# Patient Record
Sex: MALE | Race: WHITE
[De-identification: names, ages, dates, MRNs, and addresses within clinical notes are randomized per-mention and may not be internally consistent; named-entity substitution may affect disease eponyms.]

---

## 2019-12-13 ENCOUNTER — HOSPITAL ENCOUNTER (INPATIENT)
Dept: HOSPITAL 25 - ED | Age: 84
LOS: 8 days | Discharge: HOME | DRG: 698 | End: 2019-12-21
Attending: INTERNAL MEDICINE | Admitting: INTERNAL MEDICINE
Payer: MEDICARE

## 2019-12-13 DIAGNOSIS — M48.00: ICD-10-CM

## 2019-12-13 DIAGNOSIS — M35.9: ICD-10-CM

## 2019-12-13 DIAGNOSIS — E83.42: ICD-10-CM

## 2019-12-13 DIAGNOSIS — N17.9: ICD-10-CM

## 2019-12-13 DIAGNOSIS — J84.9: ICD-10-CM

## 2019-12-13 DIAGNOSIS — G93.41: ICD-10-CM

## 2019-12-13 DIAGNOSIS — E87.5: ICD-10-CM

## 2019-12-13 DIAGNOSIS — N18.3: ICD-10-CM

## 2019-12-13 DIAGNOSIS — B95.2: ICD-10-CM

## 2019-12-13 DIAGNOSIS — E16.2: ICD-10-CM

## 2019-12-13 DIAGNOSIS — D63.1: ICD-10-CM

## 2019-12-13 DIAGNOSIS — I13.0: ICD-10-CM

## 2019-12-13 DIAGNOSIS — Z79.82: ICD-10-CM

## 2019-12-13 DIAGNOSIS — N13.9: Primary | ICD-10-CM

## 2019-12-13 DIAGNOSIS — E78.5: ICD-10-CM

## 2019-12-13 DIAGNOSIS — Z95.1: ICD-10-CM

## 2019-12-13 DIAGNOSIS — I48.91: ICD-10-CM

## 2019-12-13 DIAGNOSIS — N32.0: ICD-10-CM

## 2019-12-13 DIAGNOSIS — E78.00: ICD-10-CM

## 2019-12-13 DIAGNOSIS — I25.10: ICD-10-CM

## 2019-12-13 DIAGNOSIS — E83.39: ICD-10-CM

## 2019-12-13 DIAGNOSIS — M19.90: ICD-10-CM

## 2019-12-13 DIAGNOSIS — I50.43: ICD-10-CM

## 2019-12-13 DIAGNOSIS — K21.9: ICD-10-CM

## 2019-12-13 DIAGNOSIS — N39.0: ICD-10-CM

## 2019-12-13 LAB
ALBUMIN SERPL BCG-MCNC: 3.7 G/DL (ref 3.2–5.2)
ALBUMIN/GLOB SERPL: 1.1 {RATIO} (ref 1–3)
ALP SERPL-CCNC: 228 U/L (ref 34–104)
ALT SERPL W P-5'-P-CCNC: 24 U/L (ref 7–52)
ANION GAP SERPL CALC-SCNC: 10 MMOL/L (ref 2–11)
ANION GAP SERPL CALC-SCNC: 9 MMOL/L (ref 2–11)
APTT PPP: 31.9 SECONDS (ref 26–38)
AST SERPL-CCNC: (no result) U/L (ref 13–39)
AST SERPL-CCNC: 35 U/L (ref 13–39)
BASOPHILS # BLD AUTO: 0 10^3/UL (ref 0–0.2)
BUN SERPL-MCNC: 66 MG/DL (ref 6–24)
BUN SERPL-MCNC: 66 MG/DL (ref 6–24)
BUN/CREAT SERPL: 16.6 (ref 8–20)
BUN/CREAT SERPL: 17 (ref 8–20)
CALCIUM SERPL-MCNC: 10.2 MG/DL (ref 8.6–10.3)
CALCIUM SERPL-MCNC: 8.8 MG/DL (ref 8.6–10.3)
CHLORIDE SERPL-SCNC: 117 MMOL/L (ref 101–111)
CHLORIDE SERPL-SCNC: 117 MMOL/L (ref 101–111)
EOSINOPHIL # BLD AUTO: 0 10^3/UL (ref 0–0.6)
GLOBULIN SER CALC-MCNC: 3.4 G/DL (ref 2–4)
GLUCOSE SERPL-MCNC: 191 MG/DL (ref 70–100)
GLUCOSE SERPL-MCNC: 25 MG/DL (ref 70–100)
HCO3 SERPL-SCNC: 8 MMOL/L (ref 22–32)
HCO3 SERPL-SCNC: 8 MMOL/L (ref 22–32)
HCT VFR BLD AUTO: 30 % (ref 42–52)
HGB BLD-MCNC: 9.9 G/DL (ref 14–18)
INR PPP/BLD: 1.03 (ref 0.82–1.09)
LYMPHOCYTES # BLD AUTO: 0.9 10^3/UL (ref 1–4.8)
MCH RBC QN AUTO: 34 PG (ref 27–31)
MCHC RBC AUTO-ENTMCNC: 33 G/DL (ref 31–36)
MCV RBC AUTO: 103 FL (ref 80–94)
MONOCYTES # BLD AUTO: 0.8 10^3/UL (ref 0–0.8)
NEUTROPHILS # BLD AUTO: 7.2 10^3/UL (ref 1.5–7.7)
NRBC # BLD AUTO: 0.1 10^3/UL
NRBC BLD QL AUTO: 1.1
PLATELET # BLD AUTO: 173 10^3/UL (ref 150–450)
POTASSIUM SERPL-SCNC: (no result) MMOL/L (ref 3.5–5)
POTASSIUM SERPL-SCNC: 5.7 MMOL/L (ref 3.5–5)
POTASSIUM SERPL-SCNC: 6.7 MMOL/L (ref 3.5–5)
PROT SERPL-MCNC: 7.1 G/DL (ref 6.4–8.9)
RBC # BLD AUTO: 2.93 10^6 /UL (ref 4.18–5.48)
RBC UR QL AUTO: (no result)
SODIUM SERPL-SCNC: 134 MMOL/L (ref 135–145)
SODIUM SERPL-SCNC: 135 MMOL/L (ref 135–145)
TROPONIN I SERPL-MCNC: 0.05 NG/ML (ref ?–0.03)
WBC # BLD AUTO: 9 10^3/UL (ref 3.5–10.8)
WBC UR QL AUTO: (no result)

## 2019-12-13 PROCEDURE — 99285 EMERGENCY DEPT VISIT HI MDM: CPT

## 2019-12-13 PROCEDURE — 82330 ASSAY OF CALCIUM: CPT

## 2019-12-13 PROCEDURE — 70450 CT HEAD/BRAIN W/O DYE: CPT

## 2019-12-13 PROCEDURE — 80048 BASIC METABOLIC PNL TOTAL CA: CPT

## 2019-12-13 PROCEDURE — 93005 ELECTROCARDIOGRAM TRACING: CPT

## 2019-12-13 PROCEDURE — 80053 COMPREHEN METABOLIC PANEL: CPT

## 2019-12-13 PROCEDURE — 0T9B30Z DRAINAGE OF BLADDER WITH DRAINAGE DEVICE, PERCUTANEOUS APPROACH: ICD-10-PCS | Performed by: RADIOLOGY

## 2019-12-13 PROCEDURE — BT1BZZZ FLUOROSCOPY OF BLADDER AND URETHRA: ICD-10-PCS | Performed by: RADIOLOGY

## 2019-12-13 PROCEDURE — 80320 DRUG SCREEN QUANTALCOHOLS: CPT

## 2019-12-13 PROCEDURE — 87086 URINE CULTURE/COLONY COUNT: CPT

## 2019-12-13 PROCEDURE — 81003 URINALYSIS AUTO W/O SCOPE: CPT

## 2019-12-13 PROCEDURE — 84100 ASSAY OF PHOSPHORUS: CPT

## 2019-12-13 PROCEDURE — 87077 CULTURE AEROBIC IDENTIFY: CPT

## 2019-12-13 PROCEDURE — 83550 IRON BINDING TEST: CPT

## 2019-12-13 PROCEDURE — 76942 ECHO GUIDE FOR BIOPSY: CPT

## 2019-12-13 PROCEDURE — 83735 ASSAY OF MAGNESIUM: CPT

## 2019-12-13 PROCEDURE — 87040 BLOOD CULTURE FOR BACTERIA: CPT

## 2019-12-13 PROCEDURE — 85730 THROMBOPLASTIN TIME PARTIAL: CPT

## 2019-12-13 PROCEDURE — 82803 BLOOD GASES ANY COMBINATION: CPT

## 2019-12-13 PROCEDURE — 71045 X-RAY EXAM CHEST 1 VIEW: CPT

## 2019-12-13 PROCEDURE — 51102 DRAIN BL W/CATH INSERTION: CPT

## 2019-12-13 PROCEDURE — 51100 DRAIN BLADDER BY NEEDLE: CPT

## 2019-12-13 PROCEDURE — 83540 ASSAY OF IRON: CPT

## 2019-12-13 PROCEDURE — 74176 CT ABD & PELVIS W/O CONTRAST: CPT

## 2019-12-13 PROCEDURE — C2627 CATH, SUPRAPUBIC/CYSTOSCOPIC: HCPCS

## 2019-12-13 PROCEDURE — 81015 MICROSCOPIC EXAM OF URINE: CPT

## 2019-12-13 PROCEDURE — 85027 COMPLETE CBC AUTOMATED: CPT

## 2019-12-13 PROCEDURE — 82746 ASSAY OF FOLIC ACID SERUM: CPT

## 2019-12-13 PROCEDURE — G0480 DRUG TEST DEF 1-7 CLASSES: HCPCS

## 2019-12-13 PROCEDURE — 85025 COMPLETE CBC W/AUTO DIFF WBC: CPT

## 2019-12-13 PROCEDURE — 82728 ASSAY OF FERRITIN: CPT

## 2019-12-13 PROCEDURE — 83605 ASSAY OF LACTIC ACID: CPT

## 2019-12-13 PROCEDURE — 84484 ASSAY OF TROPONIN QUANT: CPT

## 2019-12-13 PROCEDURE — 85610 PROTHROMBIN TIME: CPT

## 2019-12-13 PROCEDURE — 77002 NEEDLE LOCALIZATION BY XRAY: CPT

## 2019-12-13 PROCEDURE — 82607 VITAMIN B-12: CPT

## 2019-12-13 PROCEDURE — 87186 SC STD MICRODIL/AGAR DIL: CPT

## 2019-12-13 PROCEDURE — 87641 MR-STAPH DNA AMP PROBE: CPT

## 2019-12-13 PROCEDURE — 36415 COLL VENOUS BLD VENIPUNCTURE: CPT

## 2019-12-13 NOTE — XMS REPORT
Patient Summary Document

 Created on:2019



Patient:OBDULIA MONTELONGO JR Unknown

Sex:Male

:1932

External Reference #:[id number]





Demographics







 Address  [address] [place] [address]

 

 Home Phone  [phone]

 

 Preferred Language  English

 

 Marital Status  Unknown

 

 [employer] Affiliation  Unknown

 

 Race  Unknown

 

 Ethnic Group  Unknown









Author







 Organization  [facility]









Support







 Name  Relationship  Address  Phone

 

 Unknown Name, Unknown Name  NextOfKin  Unknown Address  Unavailable









Care Team Providers







 Name  Role  Phone

 

 Unavailable  Unavailable  Unavailable









Problems







 Condition  Condition  Condition  Status  Onset  Resolution  Last  Treating  
Comments



 Name  Details  Category    Date  Date  Treatment  Clinician  



             Date    

 

 Pain  frequent  Pain Mgmt  Active  2019      [place]  



   pain      0-16      [place]ck  



         10:00:      [id number]  



         00        

 

 Respiratory  lung sounds  Respirator  Active  2019      [place]  



   deficit  y    0-16      [place]  



         10:00:      [id number]  



         00        

 

 Respiratory  dyspnea  Respirator  Active  2019      [place]  



   present  y    0-16      [place]  



         10:00:      [id number]  



         00        

 

 Endo/Hema  anti-coagul  Endo/Hema  Active  2019      [place]  



   ation      0-16      [place]  



   therapy      10:00:      [id number]  



         00        

 

 Sensory  impaired  Sensory  Active  2019      [place]  



   hearing      0-16      [place]  



         10:00:      [id number]  



         00        

 

 Integument  skin  Integument  Active  2019      [place]  



   integrity      0-16      [place]  



   risk      10:00:      [id number]  



         00        

 

 Nutrition  nutritional  Nutrition  Active  2019      [place]  



   restriction      0-16      [place]  



   s      10:00:      [id number]  



         00        

 

 Elimination  catheter  Eliminatio  Active  2019      [place]  



   present  n    0-16      [place]  



         10:00:      [id number]  



         00        

 

 Neuro  confusion  Neuro/Emot  Active  2019      [place]  



   present  ion    0-16      [place]  



         10:00:      [id number]  



         00        

 

 Neuro  impaired  Neuro/Emot  Active  -      [place]  



   decision-ma  ion    0-16      [place]  



   evelin      10:00:      [id number]  



         00        

 

 Activity  ADL  Activity  Active  -      [place]  



   assistance      0-16      [place]  



   required      10:00:      [id number]  



         00        

 

 Activity  self-care  Activity  Active  -      [place]  



   deficit      0-16      [place]  



         10:00:      [id number]  



         00        

 

 Safety  fall risk  Safety  Active  -      [place]  



   factor      0-16      [place]  



   present      10:00:      [id number]  



         00        

 

 Safety  risk for  Safety  Active  2019      [place]  



   hospitaliza      0-16      [place]  



   tion      10:00:      [id number]  



         00        

 

 Medication  oral med  Meds  Active  2019      [place]  



   assistance      0-16      [place]  



   required      10:00:      [id number]  



         00        

 

 Musculoskel  transfer  Musculoske  Active  2019      [place]  



 etal  assistance  letal    0-16      [place]  



   required      10:00:      [id number]  



         00        

 

 Musculoskel  requires  Musculoske  Active  2019      [place]  



 etal  human  letal    0-16      [place]  



   assist to      10:00:      [id number]  



   leave home      00        

 

 Safety  cannot be  Safety  Active  2019      Angelic  



   left alone      1-12      Ingrahm  



         10:30:      [id number]  



         00        

 

 Safety  can be left  Safety  Active  2019      Angelic  



   alone for      1-12      Ingrahm  



   only short      10:30:      [id number]  



   periods      00        







Allergies, Adverse Reactions, Alerts







 Allergy Name  Allergy  Status  Severity  Reaction(s)  Onset  Inactive  
Treating  Comments



   Type        Date  Date  Clinician  

 

 simvastatin  Base  Active  Unknown  Reaction  2019    Jael Beam  



   Ingredient      Unknown  0-15      







Medications







 Ordered  Filled  Start  Stop  Current  Ordering  Indication  Dosage  Frequency
  Signature  Comments  Components



 Medication  Medication  Date  Date  Medication?  Clinician        (SIG)    



 Name  Name                    

 

 Klor-Con  Klor-Con  2019    Yes  Silcoff    Unknown  Unknown      



 M20 mEq  M20 mEq  0-16      Cholo TORRES            



 tablet,exte  tablet,exte                    



 nded  nded                    



 release  release                    

 

 Tums 200 mg  Tums 200 mg  2019    Yes  Silcoff    Unknown  Unknown      



 calcium  calcium  0-16      Cholo TORRES            



 (500 mg)  (500 mg)                    



 chewable  chewable                    



 tablet  tablet                    

 

 Acetaminoph  Acetaminoph  2019    Yes  Silcoff    Unknown  Unknown      



 en Pain  en Pain  0-16      Cholo TORRES            



 Relief 500  Relief 500                    



 mg tablet  mg tablet                    

 

 oxyCODONE-a  oxyCODONE-a  2019    Yes  Silcoff    Unknown  Unknown      



 cetaminophe  cetaminophe  0-16      Cholo TORRES            



 n 5 mg-325  n 5 mg-325                    



 mg tablet  mg tablet                    

 

 furosemide  furosemide  2019    Yes  Silcoff    Unknown  Unknown      



 20 mg  20 mg  0-16      Cholo TORRES            



 tablet  tablet                    

 

 metoprolol  metoprolol  2019    Yes  Silcoff    Unknown  Unknown      



 succinate  succinate  0-16      Cholo TORRES            



 ER 25 mg  ER 25 mg                    



 tablet,exte  tablet,exte                    



 nded  nded                    



 release 24  release 24                    



 hr  hr                    

 

 hydroxychlo  hydroxychlo  2019    Yes  Silcoff    Unknown  Unknown      



 roquine 200  roquine 200  0-16      Cholo TORRES            



 mg tablet  mg tablet                    

 

 omeprazole  omeprazole  2019    Yes  Silcoff    Unknown  Unknown      



 20 mg  20 mg  0-16      MD,Cholo            



 capsule,del  capsule,del                    



 ayed  ayed                    



 release  release                    

 

 Aspirin Low  Aspirin Low  2019    Yes  Silcoff    Unknown  Unknown      



 Dose 81 mg  Dose 81 mg  0-16      MD,Cholo            



 tablet,dina  tablet,dina                    



 yed release  yed release                    

 

 atorvastati  atorvastati  2019    Yes  Silcoff    Unknown  Unknown      



 n 10 mg  n 10 mg  0-16      MD,Cholo            



 tablet  tablet                    







Vital Signs







 Vital Name  Observation Time  Observation Value  Comments

 

 SYSTOLIC mm[Hg]  2019-10-16 18:08:22  128 mm[Hg] mm[Hg]  Method: Stand

 

 DIASTOLIC mm[Hg]  2019-10-16 18:08:22  70 mm[Hg] mm[Hg]  Method: Stand







Procedures

This patient has no known procedures.



Results

This patient has no known results.

## 2019-12-13 NOTE — XMS REPORT
Patient Summary Document

 Created on:2019



Patient:OBDULIA MONTELONGO JR Unknown

Sex:Male

:1932

External Reference #:[id number]





Demographics







 Address  [address] [place] [address]

 

 Home Phone  [phone]

 

 Preferred Language  English

 

 Marital Status  Unknown

 

 Shinto Affiliation  Unknown

 

 Race  Unknown

 

 Ethnic Group  Unknown









Author







 Organization  [facility]









Support







 Name  Relationship  Address  Phone

 

 Unknown Name, Unknown Name  NextOfKin  Unknown Address  Unavailable









Care Team Providers







 Name  Role  Phone

 

 Unavailable  Unavailable  Unavailable









Problems







 Condition  Condition  Condition  Status  Onset  Resolution  Last  Treating  
Comments



 Name  Details  Category    Date  Date  Treatment  Clinician  



             Date    

 

 Pain  frequent  Pain Mgmt  Resolve  2019    [place]  



   pain    d  0-16  11:00:00    John Day  



         10:00:      [id number]  



         00        

 

 Respiratory  lung sounds  Respirator  Resolve  2019    [place]  



   deficit  y  d  0-16  11:00:00    John Day  



         10:00:      [id number]  



         00        

 

 Respiratory  dyspnea  Respirator  Active  2019      [place]  



   present  y    0-16      John Day  



         10:00:      [id number]  



         00        

 

 Endo/Hema  anti-coagul  Endo/Hema  Resolve  2019    [place]  



   ation    d  0-16  11:00:00    John Day  



   therapy      10:00:      [id number]  



         00        

 

 Sensory  impaired  Sensory  Active  2019      [place]  



   hearing      0-16      John Day  



         10:00:      [id number]  



         00        

 

 Integument  skin  Integument  Resolve  2019    [place]  



   integrity    d  0-16  11:00:00    John Day  



   risk      10:00:      [id number]  



         00        

 

 Nutrition  nutritional  Nutrition  Resolve  2019    [place]  



   restriction    d  0-16  11:00:00    John Day  



   s      10:00:      [id number]  



         00        

 

 Elimination  catheter  Eliminatio  Resolve  2019    [place]  



   present  n  d  0-16  11:00:00    John Day  



         10:00:      [id number]  



         00        

 

 Neuro  confusion  Neuro/Emot  Active  2019      [place]  



   present  ion    0-16      John Day  



         10:00:      [id number]  



         00        

 

 Neuro  impaired  Neuro/Emot  Active  2019      [place]  



   decision-ma  ion    0-16      John Day  



   evelin      10:00:      [id number]  



         00        

 

 Activity  ADL  Activity  Active  2019      [place]  



   assistance      0-16      John Day  



   required      10:00:      [id number]  



         00        

 

 Activity  self-care  Activity  Resolve  2019    [place]  



   deficit    d  0-16  11:00:00    John Day  



         10:00:      [id number]  



         00        

 

 Safety  fall risk  Safety  Active  2019      [place]  



   factor      0-16      John Day  



   present      10:00:      [id number]  



         00        

 

 Safety  risk for  Safety  Active  2019      [place]  



   hospitaliza      0-16      John Day  



   tion      10:00:      [id number]  



         00        

 

 Medication  oral med  Meds  Active  2019      [place]  



   assistance      0-16      John Day  



   required      10:00:      [id number]  



         00        

 

 Musculoskel  transfer  Musculoske  Resolve  2019    [place]  



 etal  assistance  letal  d  0-16  11:00:00    John Day  



   required      10:00:      [id number]  



         00        

 

 Musculoskel  requires  Musculoske  Resolve  2019    [place]  



 etal  human  letal  d  0-16  11:00:00    John Day  



   assist to      10:00:      [id number]  



   leave home      00        

 

 Safety  cannot be  Safety  Active  2019      [place]  



   left alone      1-12      [place]  



         10:30:      [id number]  



         00        

 

 Safety  can be left  Safety  Active  2019      Angelic  



   alone for      1-12      Ingrahm  



   only short      10:30:      [id number]  



   periods      00        







Allergies, Adverse Reactions, Alerts







 Allergy Name  Allergy  Status  Severity  Reaction(s)  Onset  Inactive  
Treating  Comments



   Type        Date  Date  Clinician  

 

 simvastatin  Base  Active  Unknown  Reaction  2019    Jael Beam  



   Ingredient      Unknown  0-15      







Medications







 Ordered  Filled  Start  Stop  Current  Ordering  Indication  Dosage  Frequency
  Signature  Comments  Components



 Medication  Medication  Date  Date  Medication?  Clinician        (SIG)    



 Name  Name                    

 

 Klor-Con  Klor-Con  2019    Yes  Silcoff    Unknown  Unknown      



 M20 mEq  M20 mEq  0-16      Cholo TORRES            



 tablet,exte  tablet,exte                    



 nded  nded                    



 release  release                    

 

 Tums 200 mg  Tums 200 mg  2019    Yes  Silcoff    Unknown  Unknown      



 calcium  calcium  0-16      Cholo TORRES            



 (500 mg)  (500 mg)                    



 chewable  chewable                    



 tablet  tablet                    

 

 Acetaminoph  Acetaminoph  2019    Yes  Silcoff    Unknown  Unknown      



 en Pain  en Pain  0-16      Cholo TORRES            



 Relief 500  Relief 500                    



 mg tablet  mg tablet                    

 

 oxyCODONE-a  oxyCODONE-a  2019    Yes  Silcoff    Unknown  Unknown      



 cetaminophe  cetaminophe  0-16      Cholo TORRES            



 n 5 mg-325  n 5 mg-325                    



 mg tablet  mg tablet                    

 

 furosemide  furosemide  2019    Yes  Silcoff    Unknown  Unknown      



 20 mg  20 mg  0-16      Cohlo TORRES            



 tablet  tablet                    

 

 metoprolol  metoprolol  2019    Yes  Silcoff    Unknown  Unknown      



 succinate  succinate  0-16      MD,Cholo            



 ER 25 mg  ER 25 mg                    



 tablet,exte  tablet,exte                    



 nded  nded                    



 release 24  release 24                    



 hr  hr                    

 

 hydroxychlo  hydroxychlo  2019    Yes  Silcoff    Unknown  Unknown      



 roquine 200  roquine 200  0-16      MD,Cholo            



 mg tablet  mg tablet                    

 

 omeprazole  omeprazole  2019    Yes  Silcoff    Unknown  Unknown      



 20 mg  20 mg  0-16      MD,Cholo            



 capsule,del  capsule,del                    



 ayed  ayed                    



 release  release                    

 

 Aspirin Low  Aspirin Low  2019    Yes  Silcoff    Unknown  Unknown      



 Dose 81 mg  Dose 81 mg  0-16      MD,Cholo            



 tablet,dina  tablet,dina                    



 yed release  yed release                    

 

 atorvastati  atorvastati  2019    Yes  Silcoff    Unknown  Unknown      



 n 10 mg  n 10 mg  0-16      MD,Cholo            



 tablet  tablet                    







Vital Signs







 Vital Name  Observation Time  Observation Value  Comments

 

 SYSTOLIC mm[Hg]  2019 18:09:09  120 mm[Hg] mm[Hg]  Method: Sit

 

 SYSTOLIC mm[Hg]  2019-10-16 18:08:22  128 mm[Hg] mm[Hg]  Method: Stand

 

 DIASTOLIC mm[Hg]  2019 18:09:09  68 mm[Hg] mm[Hg]  Method: Sit

 

 DIASTOLIC mm[Hg]  2019-10-16 18:08:22  70 mm[Hg] mm[Hg]  Method: Stand

 

 PULSE  2019 18:09:09  66 /min /min  

 

 RESP RATE  2019 18:09:09  16 /min /min  

 

 TEMP  2019 18:09:09  97.4 [degF]  







Procedures

This patient has no known procedures.



Results

This patient has no known results.

## 2019-12-13 NOTE — XMS REPORT
Patient Summary Document

 Created on:2019



Patient:OBDULIA MONTELONGO JR Unknown

Sex:Male

:1932

External Reference #:[id number]





Demographics







 Address  [address] [place] [address]

 

 Home Phone  [phone]

 

 Preferred Language  English

 

 Marital Status  Unknown

 

 [employer] Affiliation  Unknown

 

 Race  Unknown

 

 Ethnic Group  Unknown









Author







 Organization  [facility]









Support







 Name  Relationship  Address  Phone

 

 Unknown Name, Unknown Name  NextOfKin  Unknown Address  Unavailable









Care Team Providers







 Name  Role  Phone

 

 Unavailable  Unavailable  Unavailable









Problems







 Condition  Condition  Condition  Status  Onset  Resolution  Last  Treating  
Comments



 Name  Details  Category    Date  Date  Treatment  Clinician  



             Date    

 

 Pain  frequent  Pain Mgmt  Active  2019      [place]  



   pain      0-16      [place]  



         10:00:      [id number]  



         00        

 

 Respiratory  lung sounds  Respirator  Active  2019      [place]  



   deficit  y    0-16      [place]  



         10:00:      [id number]  



         00        

 

 Respiratory  dyspnea  Respirator  Active  2019      [place]  



   present  y    0-16      [place]  



         10:00:      [id number]  



         00        

 

 Endo/Hema  anti-coagul  Endo/Hema  Active  2019      [place]  



   ation      0-16      [place]  



   therapy      10:00:      [id number]  



         00        

 

 Sensory  impaired  Sensory  Active  2019      [place]  



   hearing      0-16      [place]  



         10:00:      [id number]  



         00        

 

 Integument  skin  Integument  Active  2019      [place]  



   integrity      0-16      [place]  



   risk      10:00:      [id number]  



         00        

 

 Nutrition  nutritional  Nutrition  Active  2019      [place]  



   restriction      0-16      [place]  



   s      10:00:      [id number]  



         00        

 

 Elimination  catheter  Eliminatio  Active  2019      [place]  



   present  n    0-16      [place]  



         10:00:      [id number]  



         00        

 

 Neuro  confusion  Neuro/Emot  Active  2019      [place]  



   present  ion    0-16      [place]  



         10:00:      [id number]  



         00        

 

 Neuro  impaired  Neuro/Emot  Active  -      [place]  



   decision-ma  ion    0-16      [place]  



   evelin      10:00:      [id number]  



         00        

 

 Activity  ADL  Activity  Active  -      [place]  



   assistance      0-16      [place]  



   required      10:00:      [id number]  



         00        

 

 Activity  self-care  Activity  Active  -      [place]  



   deficit      0-16      [place]  



         10:00:      [id number]  



         00        

 

 Safety  fall risk  Safety  Active  -      [place]  



   factor      0-16      [place]  



   present      10:00:      [id number]  



         00        

 

 Safety  risk for  Safety  Active  2019      [place]  



   hospitaliza      0-16      [place]  



   tion      10:00:      [id number]  



         00        

 

 Medication  oral med  Meds  Active  2019      [place]  



   assistance      0-16      [place]  



   required      10:00:      [id number]  



         00        

 

 Musculoskel  transfer  Musculoske  Active  2019      [place]  



 etal  assistance  letal    0-16      [place]  



   required      10:00:      [id number]  



         00        

 

 Musculoskel  requires  Musculoske  Active  2019      [place]  



 etal  human  letal    0-16      [place]  



   assist to      10:00:      [id number]  



   leave home      00        







Allergies, Adverse Reactions, Alerts







 Allergy Name  Allergy  Status  Severity  Reaction(s)  Onset  Inactive  
Treating  Comments



   Type        Date  Date  Clinician  

 

 simvastatin  Base  Active  Unknown  Reaction  2019    Jael Beam  



   Ingredient      Unknown  0-15      







Medications







 Ordered  Filled  Start  Stop  Current  Ordering  Indication  Dosage  Frequency
  Signature  Comments  Components



 Medication  Medication  Date  Date  Medication?  Clinician        (SIG)    



 Name  Name                    

 

 Klor-Con  Klor-Con  2019    Yes  Silcoff    Unknown  Unknown      



 M20 mEq  M20 mEq  0-16      Cholo TORRES            



 tablet,exte  tablet,exte                    



 nded  nded                    



 release  release                    

 

 Tums 200 mg  Tums 200 mg  2019    Yes  Silcoff    Unknown  Unknown      



 calcium  calcium  0-16      Cholo TORRES            



 (500 mg)  (500 mg)                    



 chewable  chewable                    



 tablet  tablet                    

 

 Acetaminoph  Acetaminoph  2019    Yes  Silcoff    Unknown  Unknown      



 en Pain  en Pain  0-16      Cholo TORRES            



 Relief 500  Relief 500                    



 mg tablet  mg tablet                    

 

 oxyCODONE-a  oxyCODONE-a  2019    Yes  Silcoff    Unknown  Unknown      



 cetaminophe  cetaminophe  0-16      Cholo TORRES            



 n 5 mg-325  n 5 mg-325                    



 mg tablet  mg tablet                    

 

 furosemide  furosemide  2019    Yes  Silcoff    Unknown  Unknown      



 20 mg  20 mg  0-16      Cholo TORRES            



 tablet  tablet                    

 

 metoprolol  metoprolol  2019    Yes  Silcoff    Unknown  Unknown      



 succinate  succinate  0-16      Cholo TORRES            



 ER 25 mg  ER 25 mg                    



 tablet,exte  tablet,exte                    



 nded  nded                    



 release 24  release 24                    



 hr  hr                    

 

 hydroxychlo  hydroxychlo  2019    Yes  Silcoff    Unknown  Unknown      



 roquine 200  roquine 200  0-16      Cholo TORRES            



 mg tablet  mg tablet                    

 

 omeprazole  omeprazole  2019    Yes  Silcoff    Unknown  Unknown      



 20 mg  20 mg  0-16      Cholo TORRES            



 capsule,del  capsule,del                    



 ayed  ayed                    



 release  release                    

 

 Aspirin Low  Aspirin Low  2019    Yes  Silcoff    Unknown  Unknown      



 Dose 81 mg  Dose 81 mg  0-16      Cholo TORRES            



 tablet,dina  tablet,dina                    



 yed release  yed release                    

 

 atorvastati  atorvastati  -    Yes  Silcoff    Unknown  Unknown      



 n 10 mg  n 10 mg  0-16      Cholo TORRES            



 tablet  tablet                    







Vital Signs







 Vital Name  Observation Time  Observation Value  Comments

 

 SYSTOLIC mm[Hg]  2019-10-16 18:08:22  120 mm[Hg] mm[Hg]  Method: Sit

 

 SYSTOLIC mm[Hg]  2019-10-16 18:08:22  128 mm[Hg] mm[Hg]  Method: Stand

 

 DIASTOLIC mm[Hg]  2019-10-16 18:08:22  70 mm[Hg] mm[Hg]  Method: Sit

 

 DIASTOLIC mm[Hg]  2019-10-16 18:08:22  70 mm[Hg] mm[Hg]  Method: Stand

 

 PULSE  2019-10-16 18:08:22  64 /min /min  

 

 RESP RATE  2019-10-16 18:08:22  14 /min /min  

 

 TEMP  2019-10-16 18:08:22  97.4 [degF]  







Procedures

This patient has no known procedures.



Results

This patient has no known results.

## 2019-12-13 NOTE — XMS REPORT
Patient Summary Document

 Created on:2019



Patient:OBDULIA MONTELONGO JR Unknown

Sex:Male

:1932

External Reference #:[id number]





Demographics







 Address  [address] [place] [address]

 

 Home Phone  [phone]

 

 Preferred Language  English

 

 Marital Status  Unknown

 

 Orthodoxy Affiliation  Unknown

 

 Race  Unknown

 

 Ethnic Group  Unknown









Author







 Organization  [facility]









Support







 Name  Relationship  Address  Phone

 

 Unknown Name, Unknown Name  NextOfKin  Unknown Address  Unavailable









Care Team Providers







 Name  Role  Phone

 

 Unavailable  Unavailable  Unavailable









Problems







 Condition  Condition  Condition  Status  Onset  Resolution  Last  Treating  
Comments



 Name  Details  Category    Date  Date  Treatment  Clinician  



             Date    

 

 Pain  frequent  Pain Mgmt  Active  2019      Keya  



   pain      0-16      Shaw  



         10:00:      [id number]  



         00        

 

 Respiratory  lung sounds  Respirator  Active  2019      [place]  



   deficit  y    0-16      Shaw  



         10:00:      [id number]  



         00        

 

 Respiratory  dyspnea  Respirator  Active  2019      [place]a  



   present  y    0-16      Shaw  



         10:00:      [id number]  



         00        

 

 Endo/Hema  anti-coagul  Endo/Hema  Active  2019      [place]  



   ation      0-16      Shaw  



   therapy      10:00:      [id number]  



         00        

 

 Sensory  impaired  Sensory  Active  2019      Keya  



   hearing      0-16      Shaw  



         10:00:      [id number]  



         00        

 

 Integument  skin  Integument  Active  2019      [place]  



   integrity      0-16      Shaw  



   risk      10:00:      [id number]  



         00        

 

 Nutrition  nutritional  Nutrition  Active  2019      Keya  



   restriction      0-16      Shaw  



   s      10:00:      [id number]  



         00        

 

 Elimination  catheter  Eliminatio  Active  2019      [place]  



   present  n    0-16      Shaw  



         10:00:      [id number]  



         00        

 

 Neuro  confusion  Neuro/Emot  Active  2019      [place]  



   present  ion    0-16      Shaw  



         10:00:      [id number]  



         00        

 

 Neuro  impaired  Neuro/Emot  Active  -      [place]  



   decision-ma  ion    0-16      Shaw  



   evelin      10:00:      [id number]  



         00        

 

 Activity  ADL  Activity  Active  -      [place]  



   assistance      0-16      Shaw  



   required      10:00:      [id number]  



         00        

 

 Activity  self-care  Activity  Active  -      [place]  



   deficit      0-16      Shaw  



         10:00:      [id number]  



         00        

 

 Safety  fall risk  Safety  Active  -      [place]  



   factor      0-16      Shaw  



   present      10:00:      [id number]  



         00        

 

 Safety  risk for  Safety  Active  2019      [place]  



   hospitaliza      0-16      Shaw  



   tion      10:00:      [id number]  



         00        

 

 Medication  oral med  Meds  Active  2019      [place]  



   assistance      0-16      Shaw  



   required      10:00:      [id number]  



         00        

 

 Musculoskel  transfer  Musculoske  Active  2019      [place]  



 etal  assistance  letal    0-16      Shaw  



   required      10:00:      [id number]  



         00        

 

 Musculoskel  requires  Musculoske  Active  2019      [place]a  



 etal  human  letal    0-16      Shaw  



   assist to      10:00:      [id number]  



   leave home      00        







Allergies, Adverse Reactions, Alerts







 Allergy Name  Allergy  Status  Severity  Reaction(s)  Onset  Inactive  
Treating  Comments



   Type        Date  Date  Clinician  

 

 simvastatin  Base  Active  Unknown  Reaction  2019    Jael Beam  



   Ingredient      Unknown  0-15      







Medications







 Ordered  Filled  Start  Stop  Current  Ordering  Indication  Dosage  Frequency
  Signature  Comments  Components



 Medication  Medication  Date  Date  Medication?  Clinician        (SIG)    



 Name  Name                    

 

 Klor-Con  Klor-Con  2019    Yes  Silcoff    Unknown  Unknown      



 M20 mEq  M20 mEq  0-16      Cholo TORRES            



 tablet,exte  tablet,exte                    



 nded  nded                    



 release  release                    

 

 Tums 200 mg  Tums 200 mg  2019    Yes  Silcoff    Unknown  Unknown      



 calcium  calcium  0-16      Cholo TORRES            



 (500 mg)  (500 mg)                    



 chewable  chewable                    



 tablet  tablet                    

 

 Acetaminoph  Acetaminoph  2019    Yes  Silcoff    Unknown  Unknown      



 en Pain  en Pain  0-16      Cholo TORRES            



 Relief 500  Relief 500                    



 mg tablet  mg tablet                    

 

 oxyCODONE-a  oxyCODONE-a  2019    Yes  Silcoff    Unknown  Unknown      



 cetaminophe  cetaminophe  0-16      Cholo TORRES            



 n 5 mg-325  n 5 mg-325                    



 mg tablet  mg tablet                    

 

 furosemide  furosemide  2019    Yes  Silcoff    Unknown  Unknown      



 20 mg  20 mg  0-16      Cholo TORRES            



 tablet  tablet                    

 

 metoprolol  metoprolol  2019    Yes  Silcoff    Unknown  Unknown      



 succinate  succinate  0-16      Cholo TORRES            



 ER 25 mg  ER 25 mg                    



 tablet,exte  tablet,exte                    



 nded  nded                    



 release 24  release 24                    



 hr  hr                    

 

 hydroxychlo  hydroxychlo  2019    Yes  Silcoff    Unknown  Unknown      



 roquine 200  roquine 200  0-16      Cholo TORRES            



 mg tablet  mg tablet                    

 

 omeprazole  omeprazole  2019    Yes  Silcoff    Unknown  Unknown      



 20 mg  20 mg  0-16      Cholo TORRES            



 capsule,del  capsule,del                    



 ayed  ayed                    



 release  release                    

 

 Aspirin Low  Aspirin Low  2019    Yes  Silcoff    Unknown  Unknown      



 Dose 81 mg  Dose 81 mg  0-16      Cholo TORRES            



 tablet,dina  tablet,dina                    



 yed release  yed release                    

 

 atorvastati  atorvastati  -    Yes  Silcoff    Unknown  Unknown      



 n 10 mg  n 10 mg  0-16      Cholo TORRES            



 tablet  tablet                    







Vital Signs







 Vital Name  Observation Time  Observation Value  Comments

 

 SYSTOLIC mm[Hg]  2019-10-16 18:08:22  120 mm[Hg] mm[Hg]  Method: Sit

 

 SYSTOLIC mm[Hg]  2019-10-16 18:08:22  128 mm[Hg] mm[Hg]  Method: Stand

 

 DIASTOLIC mm[Hg]  2019-10-16 18:08:22  70 mm[Hg] mm[Hg]  Method: Sit

 

 DIASTOLIC mm[Hg]  2019-10-16 18:08:22  70 mm[Hg] mm[Hg]  Method: Stand

 

 PULSE  2019-10-16 18:08:22  64 /min /min  

 

 RESP RATE  2019-10-16 18:08:22  14 /min /min  

 

 TEMP  2019-10-16 18:08:22  97.4 [degF]  







Procedures

This patient has no known procedures.



Results

This patient has no known results.

## 2019-12-13 NOTE — XMS REPORT
Patient Summary Document

 Created on:November 15, 2019



Patient:OBDULIA MONTELONGO JR Unknown

Sex:Male

:1932

External Reference #:[id number]





Demographics







 Address  [address] [place] [address]

 

 Home Phone  [phone]

 

 Preferred Language  English

 

 Marital Status  Unknown

 

 Yazidi Affiliation  Unknown

 

 Race  Unknown

 

 Ethnic Group  Unknown









Author







 Organization  [facility]









Support







 Name  Relationship  Address  Phone

 

 Unknown Name, Unknown Name  NextOfKin  Unknown Address  Unavailable









Care Team Providers







 Name  Role  Phone

 

 Unavailable  Unavailable  Unavailable









Problems







 Condition  Condition  Condition  Status  Onset  Resolution  Last  Treating  
Comments



 Name  Details  Category    Date  Date  Treatment  Clinician  



             Date    

 

 Pain  frequent  Pain Mgmt  Active  2019      Keya  



   pain      0-16      [place]  



         10:00:      [id number]  



         00        

 

 Respiratory  lung sounds  Respirator  Active  2019      [place]  



   deficit  y    0-16      [place]  



         10:00:      [id number]  



         00        

 

 Respiratory  dyspnea  Respirator  Active  2019      Keya  



   present  y    0-16      [place]  



         10:00:      [id number]  



         00        

 

 Endo/Hema  anti-coagul  Endo/Hema  Active  2019      Keya  



   ation      0-16      [place]  



   therapy      10:00:      [id number]  



         00        

 

 Sensory  impaired  Sensory  Active  2019      Keya  



   hearing      0-16      [place]  



         10:00:      [id number]  



         00        

 

 Integument  skin  Integument  Active  2019      Keya  



   integrity      0-16      [place]  



   risk      10:00:      [id number]  



         00        

 

 Nutrition  nutritional  Nutrition  Active  2019      Keya  



   restriction      0-16      [place]  



   s      10:00:      [id number]  



         00        

 

 Elimination  catheter  Eliminatio  Active  2019      Keya  



   present  n    0-16      [place]  



         10:00:      [id number]  



         00        

 

 Neuro  confusion  Neuro/Emot  Active  2019      Keya  



   present  ion    0-16      [place]  



         10:00:      [id number]  



         00        

 

 Neuro  impaired  Neuro/Emot  Active  -      Keya  



   decision-ma  ion    0-16      [place]  



   evelin      10:00:      [id number]  



         00        

 

 Activity  ADL  Activity  Active  -      Keya  



   assistance      0-16      [place]  



   required      10:00:      [id number]  



         00        

 

 Activity  self-care  Activity  Active  -      Keya  



   deficit      0-16      [place]  



         10:00:      [id number]  



         00        

 

 Safety  fall risk  Safety  Active  -      Keya  



   factor      0-16      [place]  



   present      10:00:      [id number]  



         00        

 

 Safety  risk for  Safety  Active  2019      [facility]a  



   hospitaliza      0-16      [place]  



   tion      10:00:      [id number]  



         00        

 

 Medication  oral med  Meds  Active  2019      Keya  



   assistance      0-16      [place]  



   required      10:00:      [id number]  



         00        

 

 Musculoskel  transfer  Musculoske  Active  2019      Keya  



 etal  assistance  letal    0-16      [place]  



   required      10:00:      [id number]  



         00        

 

 Musculoskel  requires  Musculoske  Active  2019      Keya  



 etal  human  letal    0-16      [place]  



   assist to      10:00:      [id number]  



   leave home      00        







Allergies, Adverse Reactions, Alerts







 Allergy Name  Allergy  Status  Severity  Reaction(s)  Onset  Inactive  
Treating  Comments



   Type        Date  Date  Clinician  

 

 simvastatin  Base  Active  Unknown  Reaction  2019    Jael Beam  



   Ingredient      Unknown  0-15      







Medications







 Ordered  Filled  Start  Stop  Current  Ordering  Indication  Dosage  Frequency
  Signature  Comments  Components



 Medication  Medication  Date  Date  Medication?  Clinician        (SIG)    



 Name  Name                    

 

 Klor-Con  Klor-Con  2019    Yes  Silcoff    Unknown  Unknown      



 M20 mEq  M20 mEq  0-16      Cholo TORRES            



 tablet,exte  tablet,exte                    



 nded  nded                    



 release  release                    

 

 Tums 200 mg  Tums 200 mg  2019    Yes  Silcoff    Unknown  Unknown      



 calcium  calcium  0-16      Cholo TORRES            



 (500 mg)  (500 mg)                    



 chewable  chewable                    



 tablet  tablet                    

 

 Acetaminoph  Acetaminoph  2019    Yes  Silcoff    Unknown  Unknown      



 en Pain  en Pain  0-16      Cholo TORRES            



 Relief 500  Relief 500                    



 mg tablet  mg tablet                    

 

 oxyCODONE-a  oxyCODONE-a  2019    Yes  Silcoff    Unknown  Unknown      



 cetaminophe  cetaminophe  0-16      Cholo TORRES            



 n 5 mg-325  n 5 mg-325                    



 mg tablet  mg tablet                    

 

 furosemide  furosemide  2019    Yes  Silcoff    Unknown  Unknown      



 20 mg  20 mg  0-16      Cholo TORRES            



 tablet  tablet                    

 

 metoprolol  metoprolol  2019    Yes  Silcoff    Unknown  Unknown      



 succinate  succinate  0-16      Cholo TORRES            



 ER 25 mg  ER 25 mg                    



 tablet,exte  tablet,exte                    



 nded  nded                    



 release 24  release 24                    



 hr  hr                    

 

 hydroxychlo  hydroxychlo  2019    Yes  Silcoff    Unknown  Unknown      



 roquine 200  roquine 200  0-16      Cholo TORRES            



 mg tablet  mg tablet                    

 

 omeprazole  omeprazole  2019    Yes  Silcoff    Unknown  Unknown      



 20 mg  20 mg  0-16      Cholo TORRES            



 capsule,del  capsule,del                    



 ayed  ayed                    



 release  release                    

 

 Aspirin Low  Aspirin Low  2019    Yes  Silcoff    Unknown  Unknown      



 Dose 81 mg  Dose 81 mg  0-16      Cholo TORRES            



 tablet,dina  tablet,dina                    



 yed release  yed release                    

 

 atorvastati  atorvastati  -    Yes  Silcoff    Unknown  Unknown      



 n 10 mg  n 10 mg  0-16      Cholo TORRES            



 tablet  tablet                    







Vital Signs







 Vital Name  Observation Time  Observation Value  Comments

 

 SYSTOLIC mm[Hg]  2019-10-16 18:08:22  120 mm[Hg] mm[Hg]  Method: Sit

 

 SYSTOLIC mm[Hg]  2019-10-16 18:08:22  128 mm[Hg] mm[Hg]  Method: Stand

 

 DIASTOLIC mm[Hg]  2019-10-16 18:08:22  70 mm[Hg] mm[Hg]  Method: Sit

 

 DIASTOLIC mm[Hg]  2019-10-16 18:08:22  70 mm[Hg] mm[Hg]  Method: Stand

 

 PULSE  2019-10-16 18:08:22  64 /min /min  

 

 RESP RATE  2019-10-16 18:08:22  14 /min /min  

 

 TEMP  2019-10-16 18:08:22  97.4 [degF]  







Procedures

This patient has no known procedures.



Results

This patient has no known results.

## 2019-12-13 NOTE — XMS REPORT
Patient Summary Document

 Created on:December 3, 2019



Patient:OBDULIA MONTELONGO JR Unknown

Sex:Male

:1932

External Reference #:[id number]





Demographics







 Address  [address] [place] [address]

 

 Home Phone  [phone]

 

 Preferred Language  English

 

 Marital Status  Unknown

 

 Sabianism Affiliation  Unknown

 

 Race  Unknown

 

 Ethnic Group  Unknown









Author







 Organization  [facility]









Support







 Name  Relationship  Address  Phone

 

 Unknown Name, Unknown Name  NextOfKin  Unknown Address  Unavailable









Care Team Providers







 Name  Role  Phone

 

 Unavailable  Unavailable  Unavailable









Problems







 Condition  Condition  Condition  Status  Onset  Resolution  Last  Treating  
Comments



 Name  Details  Category    Date  Date  Treatment  Clinician  



             Date    

 

 Pain  frequent  Pain Mgmt  Resolve  2019    [place]  



   pain    d  0-16  11:00:00    Connelly  



         10:00:      [id number]  



         00        

 

 Respiratory  lung sounds  Respirator  Resolve  2019    [place]  



   deficit  y  d  0-16  11:00:00    Connelly  



         10:00:      [id number]  



         00        

 

 Respiratory  dyspnea  Respirator  Active  2019      [place]  



   present  y    0-16      [place]nelly  



         10:00:      [id number]  



         00        

 

 Endo/Hema  anti-coagul  Endo/Hema  Resolve  2019    [place]  



   ation    d  0-16  11:00:00    [place]nelly  



   therapy      10:00:      [id number]  



         00        

 

 Sensory  impaired  Sensory  Active  2019      [place]  



   hearing      0-16      Connelly  



         10:00:      [id number]  



         00        

 

 Integument  skin  Integument  Resolve  2019    [place]  



   integrity    d  0-16  11:00:00    Connelly  



   risk      10:00:      [id number]  



         00        

 

 Nutrition  nutritional  Nutrition  Resolve  2019    [place]  



   restriction    d  0-16  11:00:00    [place]nelly  



   s      10:00:      [id number]  



         00        

 

 Elimination  catheter  Eliminatio  Resolve  2019    [place]  



   present  n  d  0-16  11:00:00    [place]nelly  



         10:00:      [id number]  



         00        

 

 Neuro  confusion  Neuro/Emot  Active  2019      [place]  



   present  ion    0-16      [place]nelly  



         10:00:      [id number]  



         00        

 

 Neuro  impaired  Neuro/Emot  Active  2019      [place]  



   decision-ma  ion    0-16      [place]nel[place]  



   evelin      10:00:      [id number]  



         00        

 

 Activity  ADL  Activity  Active  2019      [place]  



   assistance      0-16      [place]  



   required      10:00:      [id number]  



         00        

 

 Activity  self-care  Activity  Resolve  2019    [place]  



   deficit    d  0-16  11:00:00    [place]nelly  



         10:00:      [id number]  



         00        

 

 Safety  fall risk  Safety  Active  2019      [place]  



   factor      0-16      [place]  



   present      10:00:      [id number]  



         00        

 

 Safety  risk for  Safety  Active  2019      [place]  



   hospitaliza      0-16      [place]  



   tion      10:00:      [id number]  



         00        

 

 Medication  oral med  Meds  Active  2019      [place]  



   assistance      0-16      [place]  



   required      10:00:      [id number]  



         00        

 

 Musculoskel  transfer  Musculoske  Resolve  2019    [place]  



 etal  assistance  letal  d  0-16  11:00:00    [place]nelly  



   required      10:00:      [id number]  



         00        

 

 Musculoskel  requires  Musculoske  Resolve  2019    [place]  



 etal  human  letal  d  0-16  11:00:00    [place]  



   assist to      10:00:      [id number]  



   leave home      00        

 

 Safety  cannot be  Safety  Active  2019      Angelic  



   left alone      1-12      [place]  



         10:30:      [id number]  



         00        

 

 Safety  can be left  Safety  Active  2019      Angelic  



   alone for      1-12      Ingrahm  



   only short      10:30:      [id number]  



   periods      00        







Allergies, Adverse Reactions, Alerts







 Allergy Name  Allergy  Status  Severity  Reaction(s)  Onset  Inactive  
Treating  Comments



   Type        Date  Date  Clinician  

 

 simvastatin  Base  Active  Unknown  Reaction  2019    Jael Beam  



   Ingredient      Unknown  0-15      







Medications







 Ordered  Filled  Start  Stop  Current  Ordering  Indication  Dosage  Frequency
  Signature  Comments  Components



 Medication  Medication  Date  Date  Medication?  Clinician        (SIG)    



 Name  Name                    

 

 Klor-Con  Klor-Con  2019    Yes  Silcoff    Unknown  Unknown      



 M20 mEq  M20 mEq  0-16      Cholo TORRES            



 tablet,exte  tablet,exte                    



 nded  nded                    



 release  release                    

 

 Tums 200 mg  Tums 200 mg  2019    Yes  Silcoff    Unknown  Unknown      



 calcium  calcium  0-16      Cholo TORRES            



 (500 mg)  (500 mg)                    



 chewable  chewable                    



 tablet  tablet                    

 

 Acetaminoph  Acetaminoph  2019    Yes  Silcoff    Unknown  Unknown      



 en Pain  en Pain  0-16      Cholo TORRES            



 Relief 500  Relief 500                    



 mg tablet  mg tablet                    

 

 oxyCODONE-a  oxyCODONE-a  2019    Yes  Silcoff    Unknown  Unknown      



 cetaminophe  cetaminophe  0-16      Cholo TORRES            



 n 5 mg-325  n 5 mg-325                    



 mg tablet  mg tablet                    

 

 furosemide  furosemide  2019    Yes  Silcoff    Unknown  Unknown      



 20 mg  20 mg  0-16      Cholo TORRES            



 tablet  tablet                    

 

 metoprolol  metoprolol  2019    Yes  Silcoff    Unknown  Unknown      



 succinate  succinate  0-16      MD,Cholo            



 ER 25 mg  ER 25 mg                    



 tablet,exte  tablet,exte                    



 nded  nded                    



 release 24  release 24                    



 hr  hr                    

 

 hydroxychlo  hydroxychlo  2019    Yes  Silcoff    Unknown  Unknown      



 roquine 200  roquine 200  0-16      MD,Cholo            



 mg tablet  mg tablet                    

 

 omeprazole  omeprazole  2019    Yes  Silcoff    Unknown  Unknown      



 20 mg  20 mg  0-16      MD,Cholo            



 capsule,del  capsule,del                    



 ayed  ayed                    



 release  release                    

 

 Aspirin Low  Aspirin Low  2019    Yes  Silcoff    Unknown  Unknown      



 Dose 81 mg  Dose 81 mg  0-16      MD,Cholo            



 tablet,dina  tablet,dina                    



 yed release  yed release                    

 

 atorvastati  atorvastati  2019    Yes  Silcoff    Unknown  Unknown      



 n 10 mg  n 10 mg  0-16      MD,Cholo            



 tablet  tablet                    







Vital Signs







 Vital Name  Observation Time  Observation Value  Comments

 

 SYSTOLIC mm[Hg]  2019 18:09:09  120 mm[Hg] mm[Hg]  Method: Sit

 

 SYSTOLIC mm[Hg]  2019-10-16 18:08:22  128 mm[Hg] mm[Hg]  Method: Stand

 

 DIASTOLIC mm[Hg]  2019 18:09:09  68 mm[Hg] mm[Hg]  Method: Sit

 

 DIASTOLIC mm[Hg]  2019-10-16 18:08:22  70 mm[Hg] mm[Hg]  Method: Stand

 

 PULSE  2019 18:09:09  66 /min /min  

 

 RESP RATE  2019 18:09:09  16 /min /min  

 

 TEMP  2019 18:09:09  97.4 [degF]  







Procedures

This patient has no known procedures.



Results

This patient has no known results.

## 2019-12-13 NOTE — XMS REPORT
Patient Summary Document

 Created on:2019



Patient:OBDULIA MONTELONGO JR Unknown

Sex:Male

:1932

External Reference #:[id number]





Demographics







 Address  [address] [place] [address]

 

 Home Phone  [phone]

 

 Preferred Language  English

 

 Marital Status  Unknown

 

 Restoration Affiliation  Unknown

 

 Race  Unknown

 

 Ethnic Group  Unknown









Author







 Organization  [facility]









Support







 Name  Relationship  Address  Phone

 

 Unknown Name, Unknown Name  NextOfKin  Unknown Address  Unavailable









Care Team Providers







 Name  Role  Phone

 

 Unavailable  Unavailable  Unavailable









Problems







 Condition  Condition  Condition  Status  Onset  Resolution  Last  Treating  
Comments



 Name  Details  Category    Date  Date  Treatment  Clinician  



             Date    

 

 Pain  frequent  Pain Mgmt  Resolve  2019    [place]  



   pain    d  0-16  11:00:00    [place]  



         10:00:      [id number]  



         00        

 

 Respiratory  lung sounds  Respirator  Resolve  2019    [place]  



   deficit  y  d  0-16  11:00:00    [place]  



         10:00:      [id number]  



         00        

 

 Respiratory  dyspnea  Respirator  Active  2019      [place]  



   present  y    0-16      [place]  



         10:00:      [id number]  



         00        

 

 Endo/Hema  anti-coagul  Endo/Hema  Resolve  2019    [place]  



   ation    d  0-16  11:00:00    [place]  



   therapy      10:00:      [id number]  



         00        

 

 Sensory  impaired  Sensory  Active  2019      [place]  



   hearing      0-16      [place]  



         10:00:      [id number]  



         00        

 

 Integument  skin  Integument  Resolve  2019    [place]  



   integrity    d  0-16  11:00:00    [place]  



   risk      10:00:      [id number]  



         00        

 

 Nutrition  nutritional  Nutrition  Resolve  2019    [place]  



   restriction    d  0-16  11:00:00    [place]  



   s      10:00:      [id number]  



         00        

 

 Elimination  catheter  Eliminatio  Resolve  2019    [place]  



   present  n  d  0-16  11:00:00    [place]  



         10:00:      [id number]  



         00        

 

 Neuro  confusion  Neuro/Emot  Active  2019      [place]  



   present  ion    0-16      [place]  



         10:00:      [id number]  



         00        

 

 Neuro  impaired  Neuro/Emot  Active  2019      [place]  



   decision-ma  ion    0-16      [place]  



   evelin      10:00:      [id number]  



         00        

 

 Activity  ADL  Activity  Active  2019      [place]  



   assistance      0-16      [place]  



   required      10:00:      [id number]  



         00        

 

 Activity  self-care  Activity  Resolve  2019    [place]  



   deficit    d  0-16  11:00:00    [place]  



         10:00:      [id number]  



         00        

 

 Safety  fall risk  Safety  Active  2019      [place]  



   factor      0-16      [place]  



   present      10:00:      [id number]  



         00        

 

 Safety  risk for  Safety  Active  2019      [place]  



   hospitaliza      0-16      [place]  



   tion      10:00:      [id number]  



         00        

 

 Medication  oral med  Meds  Active  2019      [place]  



   assistance      0-16      [place]  



   required      10:00:      [id number]  



         00        

 

 Musculoskel  transfer  Musculoske  Resolve  2019    [place]  



 etal  assistance  letal  d  0-16  11:00:00    [place]  



   required      10:00:      [id number]  



         00        

 

 Musculoskel  requires  Musculoske  Resolve  2019    [place]  



 etal  human  letal  d  0-16  11:00:00    [place]  



   assist to      10:00:      [id number]  



   leave home      00        

 

 Safety  cannot be  Safety  Active  2019      [place]  



   left alone      1-12      [place]  



         10:30:      [id number]  



         00        

 

 Safety  can be left  Safety  Active  2019      [place]  



   alone for      1-12      [place]  



   only short      10:30:      [id number]  



   periods      00        







Allergies, Adverse Reactions, Alerts







 Allergy Name  Allergy  Status  Severity  Reaction(s)  Onset  Inactive  
Treating  Comments



   Type        Date  Date  Clinician  

 

 simvastatin  Base  Active  Unknown  Reaction  2019    Jael Beam  



   Ingredient      Unknown  0-15      







Medications







 Ordered  Filled  Start  Stop  Current  Ordering  Indication  Dosage  Frequency
  Signature  Comments  Components



 Medication  Medication  Date  Date  Medication?  Clinician        (SIG)    



 Name  Name                    

 

 Klor-Con  Klor-Con  2019    Yes  Silcoff    Unknown  Unknown      



 M20 mEq  M20 mEq  0-16      Cholo TORRES            



 tablet,exte  tablet,exte                    



 nded  nded                    



 release  release                    

 

 Tums 200 mg  Tums 200 mg  2019    Yes  Sil[place]ff    Unknown  Unknown      



 calcium  calcium  0-16      Cholo TORRES            



 (500 mg)  (500 mg)                    



 chewable  chewable                    



 tablet  tablet                    

 

 Acetaminoph  Acetaminoph  2019    Yes  Silcoff    Unknown  Unknown      



 en Pain  en Pain  0-16      Cholo TORRES            



 Relief 500  Relief 500                    



 mg tablet  mg tablet                    

 

 oxyCODONE-a  oxyCODONE-a  2019    Yes  Silcoff    Unknown  Unknown      



 cetaminophe  cetaminophe  0-16      Cholo TORRES            



 n 5 mg-325  n 5 mg-325                    



 mg tablet  mg tablet                    

 

 furosemide  furosemide  2019    Yes  Silcoff    Unknown  Unknown      



 20 mg  20 mg  0-16      Cholo TORRES            



 tablet  tablet                    

 

 metoprolol  metoprolol  2019    Yes  Silcoff    Unknown  Unknown      



 succinate  succinate  0-16      MD,Cholo            



 ER 25 mg  ER 25 mg                    



 tablet,exte  tablet,exte                    



 nded  nded                    



 release 24  release 24                    



 hr  hr                    

 

 hydroxychlo  hydroxychlo  2019    Yes  Silcoff    Unknown  Unknown      



 roquine 200  roquine 200  0-16      MD,Cholo            



 mg tablet  mg tablet                    

 

 omeprazole  omeprazole  2019    Yes  Silcoff    Unknown  Unknown      



 20 mg  20 mg  0-16      MD,Cholo            



 capsule,del  capsule,del                    



 ayed  ayed                    



 release  release                    

 

 Aspirin Low  Aspirin Low  2019    Yes  Silcoff    Unknown  Unknown      



 Dose 81 mg  Dose 81 mg  0-16      MD,Cholo            



 tablet,dina  tablet,dina                    



 yed release  yed release                    

 

 atorvastati  atorvastati  2019    Yes  Silcoff    Unknown  Unknown      



 n 10 mg  n 10 mg  0-16      MD,Cholo            



 tablet  tablet                    







Vital Signs







 Vital Name  Observation Time  Observation Value  Comments

 

 SYSTOLIC mm[Hg]  2019 18:09:09  120 mm[Hg] mm[Hg]  Method: Sit

 

 SYSTOLIC mm[Hg]  2019-10-16 18:08:22  128 mm[Hg] mm[Hg]  Method: Stand

 

 DIASTOLIC mm[Hg]  2019 18:09:09  68 mm[Hg] mm[Hg]  Method: Sit

 

 DIASTOLIC mm[Hg]  2019-10-16 18:08:22  70 mm[Hg] mm[Hg]  Method: Stand

 

 PULSE  2019 18:09:09  66 /min /min  

 

 RESP RATE  2019 18:09:09  16 /min /min  

 

 TEMP  2019 18:09:09  97.4 [degF]  







Procedures

This patient has no known procedures.



Results

This patient has no known results.

## 2019-12-13 NOTE — XMS REPORT
Patient Summary Document

 Created on:2019



Patient:OBDULIA MONTELONGO JR Unknown

Sex:Male

:1932

External Reference #:[id number]





Demographics







 Address  [address] [place] [address]

 

 Home Phone  [phone]

 

 Preferred Language  English

 

 Marital Status  Unknown

 

 Denominational Affiliation  Unknown

 

 Race  Unknown

 

 Ethnic Group  Unknown









Author







 Organization  [facility]









Support







 Name  Relationship  Address  Phone

 

 Unknown Name, Unknown Name  NextOfKin  Unknown Address  Unavailable









Care Team Providers







 Name  Role  Phone

 

 Unavailable  Unavailable  Unavailable









Problems







 Condition  Condition  Condition  Status  Onset  Resolution  Last  Treating  
Comments



 Name  Details  Category    Date  Date  Treatment  Clinician  



             Date    

 

 Pain  frequent  Pain Mgmt  Active  2019      [place]  



   pain      0-16      [place]  



         10:00:      [id number]  



         00        

 

 Respiratory  lung sounds  Respirator  Active  2019      [place]  



   deficit  y    0-16      [place]  



         10:00:      [id number]  



         00        

 

 Respiratory  dyspnea  Respirator  Active  2019      [place]  



   present  y    0-16      [place]  



         10:00:      [id number]  



         00        

 

 Endo/Hema  anti-coagul  Endo/Hema  Active  2019      [place]  



   ation      0-16      [place]  



   therapy      10:00:      [id number]  



         00        

 

 Sensory  impaired  Sensory  Active  2019      [place]  



   hearing      0-16      [place]  



         10:00:      [id number]  



         00        

 

 Integument  skin  Integument  Active  2019      [place]  



   integrity      0-16      [place]  



   risk      10:00:      [id number]  



         00        

 

 Nutrition  nutritional  Nutrition  Active  2019      [place]  



   restriction      0-16      [place]  



   s      10:00:      [id number]  



         00        

 

 Elimination  catheter  Eliminatio  Active  2019      [place]  



   present  n    0-16      [place]  



         10:00:      [id number]  



         00        

 

 Neuro  confusion  Neuro/Emot  Active  2019      [place]  



   present  ion    0-16      [place]  



         10:00:      [id number]  



         00        

 

 Neuro  impaired  Neuro/Emot  Active  -      [place]  



   decision-ma  ion    0-16      [place]  



   evelin      10:00:      [id number]  



         00        

 

 Activity  ADL  Activity  Active  -      [place]  



   assistance      0-16      [place]  



   required      10:00:      [id number]  



         00        

 

 Activity  self-care  Activity  Active  -      [place]  



   deficit      0-16      [place]  



         10:00:      [id number]  



         00        

 

 Safety  fall risk  Safety  Active  -      [place]  



   factor      0-16      [place]  



   present      10:00:      [id number]  



         00        

 

 Safety  risk for  Safety  Active  2019      [place]  



   hospitaliza      0-16      [place]  



   tion      10:00:      [id number]  



         00        

 

 Medication  oral med  Meds  Active  2019      [place]  



   assistance      0-16      [place]  



   required      10:00:      [id number]  



         00        

 

 Musculoskel  transfer  Musculoske  Active  2019      [place]  



 etal  assistance  letal    0-16      [place]  



   required      10:00:      [id number]  



         00        

 

 Musculoskel  requires  Musculoske  Active  2019      [place]  



 etal  human  letal    0-16      [place]  



   assist to      10:00:      [id number]  



   leave home      00        

 

 Safety  cannot be  Safety  Active  2019      [place]  



   left alone      1-12      [place]  



         10:30:      [id number]  



         00        

 

 Safety  can be left  Safety  Active  2019      [place]  



   alone for      1-12      [place]  



   only short      10:30:      [id number]  



   periods      00        







Allergies, Adverse Reactions, Alerts







 Allergy Name  Allergy  Status  Severity  Reaction(s)  Onset  Inactive  
Treating  Comments



   Type        Date  Date  Clinician  

 

 simvastatin  Base  Active  Unknown  Reaction  2019    Jael Beam  



   Ingredient      Unknown  0-15      







Medications







 Ordered  Filled  Start  Stop  Current  Ordering  Indication  Dosage  Frequency
  Signature  Comments  Components



 Medication  Medication  Date  Date  Medication?  Clinician        (SIG)    



 Name  Name                    

 

 Klor-Con  Klor-Con  2019    Yes  Silcoff    Unknown  Unknown      



 M20 mEq  M20 mEq  0-16      Cholo TORRES            



 tablet,exte  tablet,exte                    



 nded  nded                    



 release  release                    

 

 Tums 200 mg  Tums 200 mg  2019    Yes  Silcoff    Unknown  Unknown      



 calcium  calcium  0-16      Cholo TORRES            



 (500 mg)  (500 mg)                    



 chewable  chewable                    



 tablet  tablet                    

 

 Acetaminoph  Acetaminoph  2019    Yes  Silcoff    Unknown  Unknown      



 en Pain  en Pain  0-16      Cholo TORRES            



 Relief 500  Relief 500                    



 mg tablet  mg tablet                    

 

 oxyCODONE-a  oxyCODONE-a  2019    Yes  Silcoff    Unknown  Unknown      



 cetaminophe  cetaminophe  0-16      Cholo TORRES            



 n 5 mg-325  n 5 mg-325                    



 mg tablet  mg tablet                    

 

 furosemide  furosemide  2019    Yes  Silcoff    Unknown  Unknown      



 20 mg  20 mg  0-16      Cholo TORRES            



 tablet  tablet                    

 

 metoprolol  metoprolol  2019    Yes  Silcoff    Unknown  Unknown      



 succinate  succinate  0-16      Cholo TORRES            



 ER 25 mg  ER 25 mg                    



 tablet,exte  tablet,exte                    



 nded  nded                    



 release 24  release 24                    



 hr  hr                    

 

 hydroxychlo  hydroxychlo  2019    Yes  Silcoff    Unknown  Unknown      



 roquine 200  roquine 200  0-16      Cholo TORRES            



 mg tablet  mg tablet                    

 

 omeprazole  omeprazole  2019    Yes  Silcoff    Unknown  Unknown      



 20 mg  20 mg  0-16      MD,Cholo            



 capsule,del  capsule,del                    



 ayed  ayed                    



 release  release                    

 

 Aspirin Low  Aspirin Low  2019    Yes  Silcoff    Unknown  Unknown      



 Dose 81 mg  Dose 81 mg  0-16      MD,Cholo            



 tablet,dina  tablet,dina                    



 yed release  yed release                    

 

 atorvastati  atorvastati  2019    Yes  Silcoff    Unknown  Unknown      



 n 10 mg  n 10 mg  0-16      MD,Cholo            



 tablet  tablet                    







Vital Signs







 Vital Name  Observation Time  Observation Value  Comments

 

 SYSTOLIC mm[Hg]  2019-10-16 18:08:22  128 mm[Hg] mm[Hg]  Method: Stand

 

 DIASTOLIC mm[Hg]  2019-10-16 18:08:22  70 mm[Hg] mm[Hg]  Method: Stand







Procedures

This patient has no known procedures.



Results

This patient has no known results.

## 2019-12-13 NOTE — ED
Shortness of Breath





- HPI Summary


HPI Summary: 


This patient is an 87 year old male with a Hx of CHF brought in by EMS 

presenting to [facility] with a chief complaint of SOB/altered mental status. The 

patient's last known normally was 1900 last night. EMS noted he was 

hypoglycemic and administered glucagon. Patient was found unresponsive this 

morning while snoring and could not be aroused. Glucose is 44 g/dl in the ED. 

The patient is not a known diabetic. Patient still could not be aroused. Due to 

this patient's AMS, this HPI is a level 5 caveat. 




















- History of Current Complaint


Hx Obtained From: EMS, Medical Records


Onset/Duration: Still Present





- Allergy/Home Medications


Allergies/Adverse Reactions: 


 Allergies











Allergy/AdvReac Type Severity Reaction Status Date / Time


 


No Known Allergies Allergy   Verified 08/07/19 21:48











Home Medications: 


 Home Medications





Acetaminophen [Acetaminophen Extra Strength] 500 mg PO BID PRN 12/13/19 [

History Confirmed 12/13/19]


Calcium Carbonate CHEW TAB* [Tums*] 500 mg PO BID 12/13/19 [History Confirmed 12 /13/19]


Psyllium STEVENSON* [Metamucil STEVENSON*] 1 pkt PO DAILY 12/13/19 [History Confirmed 12/13/ 19]


oxyCODONE/Acetamin 5/325 MG* [Percocet 5/325 TAB*] 1 tab PO Q6H PRN 12/13/19 [

History Confirmed 12/13/19]











PMH/Surg Hx/FS Hx/Imm Hx


Endocrine/Hematology History: 


   Denies: Hx Diabetes, Hx Thyroid Disease


Cardiovascular History: Reports: Hx Congestive Heart Failure, Hx Coronary 

Artery Disease, Hx Hypercholesterolemia, Hx Hypertension, Hx Pacemaker/ICD


Respiratory History: 


   Denies: Hx Asthma, Hx Chronic Obstructive Pulmonary Disease (COPD)


GI History: Reports: Hx Gastroesophageal Reflux Disease


   Denies: Hx Ulcer


 History: Reports: Hx Renal Disease - abnormal


   Denies: Hx Dialysis


Musculoskeletal History: Reports: Hx Arthritis, Hx Back Problems - spinal 

stenosis


Sensory History: Reports: Hx Cataracts, Hx Contacts or Glasses - reading glasses


   Denies: Hx Hearing Aid


Opthamlomology History: Reports: Hx Cataracts, Hx Contacts or Glasses - reading 

glasses


Neurological History: 


   Denies: Hx Developmental Delay


Psychiatric History: 


   Denies: Hx Panic Disorder





- Surgical History


Surgery Procedure, Year, and Place: HERNIA SURGERY 1965-CATARACTS BILATERAL 

EYES 2011,cardiac bypass, tonsillectomy, status post pacemaker


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


   Denies: Hx Hepatitis, Hx Human Immunodeficiency Virus (HIV)





- Family History


Known Family History: 


   Negative: Cardiac Disease





- Social History


Alcohol Use: None


Hx Substance Use: No


Substance Use Type: Reports: None


Hx Tobacco Use: No


Smoking Status (MU): Never Smoked Tobacco


Have You Smoked in the Last Year: No





- Additional Comments


History Additional Comments: 


LEVEL 5 CAVEAT: PMH Limited by AMS





Review of Systems


Positive: Shortness Of Breath


Neurological: Other - AMS


All Other Systems Reviewed And Are Negative: No





- Comments


Additional Review of Systems Comments: 





LEVEL 5 CAVEAT: ROS Limited by AMS 





Physical Exam





- Summary


Physical Exam Summary: 





Constitutional: Well-developed, Well-nourished, Alert. (-) Distressed


Skin: Warm, Dry


HENT: Normocephalic; Atraumatic. Pinpoint pupils 


Eyes: Conjunctiva normal


Neck: Musculoskeletal ROM normal neck. (-) JVD, (-) Stridor, (-) Tracheal 

deviation


Cardio: Rhythm regular, rate normal, Heart sounds normal; Intact distal pulses; 

The pedal pulses are 2+ and symmetric. Radial pulses are 2+ and symmetric. (-) 

Murmur


Pulmonary/Chest wall:  (-) Wheezes, mild crackles in the bases bilaterally. 


Abd: Soft, (-) tenderness, (-) Distension, (-) Guarding, (-) Rebound


Musculoskeletal: 1+ pitting edema bilaterally


Lymph: (-) Cervical adenopathy


Neuro: Withdraws from painful stimulus, non-verbal, snoring. 


Psych: Mood and affect Normal





Triage Information Reviewed: Yes


Vital Signs Reviewed: Yes


Completion Of Physical Exam Limited Due To: Altered Mental Status





Procedures





- Sedation


Patient Received Moderate/Deep Sedation with Procedure: No





Diagnostics





- Laboratory


Result Diagrams: 


 12/21/19 07:20





 12/21/19 07:20


Lab Statement: Any lab studies that have been ordered have been reviewed, and 

results considered in the medical decision making process.





- Radiology


  ** CXR


Radiology Interpretation Completed By: Radiologist


Summary of Radiographic Findings: Mild pulmonary vascular congestion and 

interstitial edema. ED Provider has reviewed this report.





- CT


  ** Abd/Pel


CT Interpretation Completed By: Radiologist


Summary of CT Findings: 1. Cholelithiasis.  2. No Hydronephrosis or 

nephrolithiasis.  3. Fat-Containing right inguinal hernia.  ED Provider has 

reviwed this report.





  ** Brain


CT Interpretation Completed By: Radiologist


Summary of CT Findings: 1. No acute intracranial abnorality by CT (MRI is more 

sensitive for acute infarct especially in posterior fossa).  2. Mild chronic 

small vessel ischemic disease is likely.  ED Provider has reviewed this report.





- EKG


  ** 1251


Cardiac Rate: NL - 83 BPM


Summary of EKG Findings: Paced rhythm, no STEMI. ED Physician has reviewed and 

interpreted this report.





Re-Evaluation





- Re-Evaluation


  ** First Eval


Re-Evaluation Time: 19:02


Comment: Patient had suprapubic catheter placed successfully.





Course/Dx





- Course


Course Of Treatment: This patient is an 87 year old male brought in by EMS 

presenting to [facility] with a chief complaint of SOB/altered mental status. 

Patient was administered Narcan, Dextrose 50%, . On physical exam, patient 

withdraws from painful stimuli, has pinpoint pupils, crackles in the bases 

bilaterally, and 1+ pitting edema bilaterally. Dr. Simba Kaur, Urology came 

in to place a catheter.  Labs reveal RBC 2.93 L, Hgb 9.9 L, Hct 30 L,  H

, MCH 34 H, RDW 16 H, MPV 10.9 H, Absolute Lymphs 0.9 L, VBG pH 7.13 L, VBG 

pCO2 29 L, VBG HCO3 9.4 L, VBG O2 Saturation 47.7 L, VBG Base Excess -18.2 L. 

Sodium 124 L, Potassium 6.7 H, Chloride 117 H, CO2 8 L, BUN 66 H, Creatinine 

3.98 H, Glucose 25 L, POC Glucose 138 H, Alkaline Phosphatase 228 H, Troponin I 

0.05 H. CXR reveals Mild pulmonary vascular congestion and interstitial edema. 

CT Abd/Pel reveals 1. Cholelithiasis.  2. No Hydronephrosis or nephrolithiasis.

  3. Fat-Containing right inguinal hernia.  Brain CT reveals 1. No acute 

intracranial abnorality by CT (MRI is more sensitive for acute infarct 

especially in posterior fossa).  2. Mild chronic small vessel ischemic disease 

is likely.  Dr. Graham, Hospitalist, stated to start treating for hyperkalemia 

and the hospitalist service would be down to reassess. Consulted with Dr. Mitchell for suprapubic placement. Consulted with Dr. Bolden for critical care.  

Discussed with Char Cristina nurse practitioner at 7:15 PM, she is passing the 

admission on to Dr. Gonzales due to high acuity in the ICU.





- Diagnoses


Provider Diagnoses: 


 Severe sepsis, Fluid overload, Renal failure, Urinary obstruction, Hyperkalemia








- Physician Notifications


Discussed Care of Patient With: Simba Kaur - Urology


Time Discussed With Above Provider: 15:05


Instructed by Provider To: MD Will See In ED - For catheter placement ASAP





- Critical Care Time


Critical Care Time:  min - 90 mins





Discharge ED





- Sign-Out/Discharge


Documenting (check all that apply): Patient Departure - Admission





- Discharge Plan


Condition: Good


Disposition: ADMITTED TO [facility]





- Billing Disposition and Condition


Condition: GOOD


Disposition: Admitted to [facility]





- Attestation Statements


Document Initiated by Denise: Yes


Documenting Scribe: Mert Schreiber


Provider For Whom Denise is Documenting (Include Credential): Fortunato Yoo MD


Scribe Attestation: 


IMert, scribed for Fortunato Yoo MD on 12/21/19 at 0954. 


Scribe Documentation Reviewed: Yes


Provider Attestation: 


The documentation as recorded by the Mert aleman accurately 

reflects the service I personally performed and the decisions made by me, Fortunato Yoo MD


Status of Scribe Document: Viewed

## 2019-12-13 NOTE — XMS REPORT
Patient Summary Document

 Created on:2019



Patient:OBDULIA MONTELONGO JR Unknown

Sex:Male

:1932

External Reference #:[id number]





Demographics







 Address  [address] [place] [address]

 

 Home Phone  [phone]

 

 Preferred Language  English

 

 Marital Status  Unknown

 

 Taoism Affiliation  Unknown

 

 Race  Unknown

 

 Ethnic Group  Unknown









Author







 Organization  [facility]









Support







 Name  Relationship  Address  Phone

 

 Unknown Name, Unknown Name  NextOfKin  Unknown Address  Unavailable









Care Team Providers







 Name  Role  Phone

 

 Unavailable  Unavailable  Unavailable









Problems







 Condition  Condition  Condition  Status  Onset  Resolution  Last  Treating  
Comments



 Name  Details  Category    Date  Date  Treatment  Clinician  



             Date    

 

 Pain  frequent  Pain Mgmt  Active  2019      [place]  



   pain      0-16      [place]  



         10:00:      [id number]  



         00        

 

 Respiratory  lung sounds  Respirator  Active  2019      [place]  



   deficit  y    0-16      [place]  



         10:00:      [id number]  



         00        

 

 Respiratory  dyspnea  Respirator  Active  2019      [place]  



   present  y    0-16      [place]  



         10:00:      [id number]  



         00        

 

 Endo/Hema  anti-coagul  Endo/Hema  Active  2019      [place]  



   ation      0-16      [place]  



   therapy      10:00:      [id number]  



         00        

 

 Sensory  impaired  Sensory  Active  2019      [place]  



   hearing      0-16      [place]  



         10:00:      [id number]  



         00        

 

 Integument  skin  Integument  Active  2019      [place]  



   integrity      0-16      [place]  



   risk      10:00:      [id number]  



         00        

 

 Nutrition  nutritional  Nutrition  Active  2019      [place]  



   restriction      0-16      [place]  



   s      10:00:      [id number]  



         00        

 

 Elimination  catheter  Eliminatio  Active  2019      [place]  



   present  n    0-16      [place]  



         10:00:      [id number]  



         00        

 

 Neuro  confusion  Neuro/Emot  Active  2019      [place]  



   present  ion    0-16      [place]  



         10:00:      [id number]  



         00        

 

 Neuro  impaired  Neuro/Emot  Active  -      [place]  



   decision-ma  ion    0-16      [place]  



   evelin      10:00:      [id number]  



         00        

 

 Activity  ADL  Activity  Active  -      [place]  



   assistance      0-16      [place]  



   required      10:00:      [id number]  



         00        

 

 Activity  self-care  Activity  Active  -      [place]  



   deficit      0-16      [place]  



         10:00:      [id number]  



         00        

 

 Safety  fall risk  Safety  Active  -      [place]  



   factor      0-16      [place]  



   present      10:00:      [id number]  



         00        

 

 Safety  risk for  Safety  Active  2019      [place]  



   hospitaliza      0-16      [place]  



   tion      10:00:      [id number]  



         00        

 

 Medication  oral med  Meds  Active  2019      [place]  



   assistance      0-16      [place]  



   required      10:00:      [id number]  



         00        

 

 Musculoskel  transfer  Musculoske  Active  2019      [place]  



 etal  assistance  letal    0-16      [place]  



   required      10:00:      [id number]  



         00        

 

 Musculoskel  requires  Musculoske  Active  2019      [place]  



 etal  human  letal    0-16      [place]  



   assist to      10:00:      [id number]  



   leave home      00        

 

 Safety  cannot be  Safety  Active  2019      [facility]  



   left alone      1-12      [place]  



         10:30:      [id number]  



         00        

 

 Safety  can be left  Safety  Active  2019      [facility]ic  



   alone for      1-12      [place]  



   only short      10:30:      [id number]  



   periods      00        







Allergies, Adverse Reactions, Alerts







 Allergy Name  Allergy  Status  Severity  Reaction(s)  Onset  Inactive  
Treating  Comments



   Type        Date  Date  Clinician  

 

 simvastatin  Base  Active  Unknown  Reaction  2019    Jael Beam  



   Ingredient      Unknown  0-15      







Medications







 Ordered  Filled  Start  Stop  Current  Ordering  Indication  Dosage  Frequency
  Signature  Comments  Components



 Medication  Medication  Date  Date  Medication?  Clinician        (SIG)    



 Name  Name                    

 

 Klor-Con  Klor-Con  2019    Yes  Silcoff    Unknown  Unknown      



 M20 mEq  M20 mEq  0-16      Cholo TORRES            



 tablet,exte  tablet,exte                    



 nded  nded                    



 release  release                    

 

 Tums 200 mg  Tums 200 mg  2019    Yes  Silcoff    Unknown  Unknown      



 calcium  calcium  0-16      Cholo TORRES            



 (500 mg)  (500 mg)                    



 chewable  chewable                    



 tablet  tablet                    

 

 Acetaminoph  Acetaminoph  2019    Yes  Silcoff    Unknown  Unknown      



 en Pain  en Pain  0-16      Cholo TORRES            



 Relief 500  Relief 500                    



 mg tablet  mg tablet                    

 

 oxyCODONE-a  oxyCODONE-a  2019    Yes  Silcoff    Unknown  Unknown      



 cetaminophe  cetaminophe  0-16      Cholo TORRES            



 n 5 mg-325  n 5 mg-325                    



 mg tablet  mg tablet                    

 

 furosemide  furosemide  2019    Yes  Silcoff    Unknown  Unknown      



 20 mg  20 mg  0-16      Cholo TORRES            



 tablet  tablet                    

 

 metoprolol  metoprolol  2019    Yes  Silcoff    Unknown  Unknown      



 succinate  succinate  0-16      Cholo TORRES            



 ER 25 mg  ER 25 mg                    



 tablet,exte  tablet,exte                    



 nded  nded                    



 release 24  release 24                    



 hr  hr                    

 

 hydroxychlo  hydroxychlo  2019    Yes  Silcoff    Unknown  Unknown      



 roquine 200  roquine 200  0-16      Cholo TORRES            



 mg tablet  mg tablet                    

 

 omeprazole  omeprazole  2019    Yes  Silcoff    Unknown  Unknown      



 20 mg  20 mg  0-16      MD,Cholo            



 capsule,del  capsule,del                    



 ayed  ayed                    



 release  release                    

 

 Aspirin Low  Aspirin Low  2019    Yes  Silcoff    Unknown  Unknown      



 Dose 81 mg  Dose 81 mg  0-16      MD,Cholo            



 tablet,dina  tablet,dina                    



 yed release  yed release                    

 

 atorvastati  atorvastati  2019    Yes  Silcoff    Unknown  Unknown      



 n 10 mg  n 10 mg  0-16      MD,Cholo            



 tablet  tablet                    







Vital Signs







 Vital Name  Observation Time  Observation Value  Comments

 

 SYSTOLIC mm[Hg]  2019-10-16 18:08:22  128 mm[Hg] mm[Hg]  Method: Stand

 

 DIASTOLIC mm[Hg]  2019-10-16 18:08:22  70 mm[Hg] mm[Hg]  Method: Stand







Procedures

This patient has no known procedures.



Results

This patient has no known results.

## 2019-12-14 LAB
ANION GAP SERPL CALC-SCNC: 10 MMOL/L (ref 2–11)
ANION GAP SERPL CALC-SCNC: 11 MMOL/L (ref 2–11)
ANION GAP SERPL CALC-SCNC: 8 MMOL/L (ref 2–11)
ANION GAP SERPL CALC-SCNC: 9 MMOL/L (ref 2–11)
BUN SERPL-MCNC: 53 MG/DL (ref 6–24)
BUN SERPL-MCNC: 60 MG/DL (ref 6–24)
BUN SERPL-MCNC: 60 MG/DL (ref 6–24)
BUN SERPL-MCNC: 62 MG/DL (ref 6–24)
BUN/CREAT SERPL: 15.2 (ref 8–20)
BUN/CREAT SERPL: 16 (ref 8–20)
BUN/CREAT SERPL: 16.2 (ref 8–20)
BUN/CREAT SERPL: 16.9 (ref 8–20)
CALCIUM SERPL-MCNC: 7.7 MG/DL (ref 8.6–10.3)
CALCIUM SERPL-MCNC: 8.5 MG/DL (ref 8.6–10.3)
CALCIUM SERPL-MCNC: 8.6 MG/DL (ref 8.6–10.3)
CALCIUM SERPL-MCNC: 8.9 MG/DL (ref 8.6–10.3)
CHLORIDE SERPL-SCNC: 106 MMOL/L (ref 101–111)
CHLORIDE SERPL-SCNC: 110 MMOL/L (ref 101–111)
CHLORIDE SERPL-SCNC: 112 MMOL/L (ref 101–111)
CHLORIDE SERPL-SCNC: 114 MMOL/L (ref 101–111)
GLUCOSE SERPL-MCNC: 110 MG/DL (ref 70–100)
GLUCOSE SERPL-MCNC: 116 MG/DL (ref 70–100)
GLUCOSE SERPL-MCNC: 116 MG/DL (ref 70–100)
GLUCOSE SERPL-MCNC: 98 MG/DL (ref 70–100)
HCO3 SERPL-SCNC: 10 MMOL/L (ref 22–32)
HCO3 SERPL-SCNC: 10 MMOL/L (ref 22–32)
HCO3 SERPL-SCNC: 14 MMOL/L (ref 22–32)
HCO3 SERPL-SCNC: 20 MMOL/L (ref 22–32)
HCT VFR BLD AUTO: 25 % (ref 42–52)
HGB BLD-MCNC: 8.3 G/DL (ref 14–18)
MAGNESIUM SERPL-MCNC: 1.2 MG/DL (ref 1.9–2.7)
MCH RBC QN AUTO: 33 PG (ref 27–31)
MCHC RBC AUTO-ENTMCNC: 33 G/DL (ref 31–36)
MCV RBC AUTO: 100 FL (ref 80–94)
PLATELET # BLD AUTO: 129 10^3/UL (ref 150–450)
POTASSIUM SERPL-SCNC: 4.5 MMOL/L (ref 3.5–5)
POTASSIUM SERPL-SCNC: 5 MMOL/L (ref 3.5–5)
POTASSIUM SERPL-SCNC: 5.4 MMOL/L (ref 3.5–5)
POTASSIUM SERPL-SCNC: 5.7 MMOL/L (ref 3.5–5)
RBC # BLD AUTO: 2.49 10^6 /UL (ref 4.18–5.48)
SODIUM SERPL-SCNC: 132 MMOL/L (ref 135–145)
SODIUM SERPL-SCNC: 133 MMOL/L (ref 135–145)
SODIUM SERPL-SCNC: 134 MMOL/L (ref 135–145)
SODIUM SERPL-SCNC: 135 MMOL/L (ref 135–145)
WBC # BLD AUTO: 8.7 10^3/UL (ref 3.5–10.8)

## 2019-12-14 RX ADMIN — METOPROLOL SUCCINATE SCH MG: 25 TABLET, EXTENDED RELEASE ORAL at 21:35

## 2019-12-14 RX ADMIN — METOPROLOL SUCCINATE SCH MG: 25 TABLET, EXTENDED RELEASE ORAL at 12:30

## 2019-12-14 RX ADMIN — PANTOPRAZOLE SODIUM SCH MG: 40 TABLET, DELAYED RELEASE ORAL at 12:30

## 2019-12-14 RX ADMIN — ATORVASTATIN CALCIUM SCH MG: 10 TABLET, FILM COATED ORAL at 12:30

## 2019-12-14 RX ADMIN — SODIUM BICARBONATE SCH MLS/HR: 84 INJECTION, SOLUTION INTRAVENOUS at 00:19

## 2019-12-14 RX ADMIN — SULFAMETHOXAZOLE AND TRIMETHOPRIM SCH TAB: 400; 80 TABLET ORAL at 12:30

## 2019-12-14 RX ADMIN — SODIUM BICARBONATE SCH MLS/HR: 84 INJECTION, SOLUTION INTRAVENOUS at 19:58

## 2019-12-14 RX ADMIN — CALCIUM CARBONATE (ANTACID) CHEW TAB 500 MG SCH MG: 500 CHEW TAB at 19:55

## 2019-12-14 RX ADMIN — SULFAMETHOXAZOLE AND TRIMETHOPRIM SCH TAB: 400; 80 TABLET ORAL at 20:56

## 2019-12-14 RX ADMIN — SODIUM BICARBONATE SCH MLS/HR: 84 INJECTION, SOLUTION INTRAVENOUS at 11:03

## 2019-12-14 NOTE — ADMNOTE
Subjective


Interval History: 





H&P





86 yo male with history of CKD3, enlarged prostate brought in by ambulance for 

unresponsiveness. When he arrived in the ED, he was hypoglycemic and received 

D50 and slowly started to respond ( he is not even a diabetic). He eventually 

became more alert and His vitals were stable, but he was confused and could not 

give us any history. His lab came back showing acute renal failure. Pt with 

incomplete voidance, nurses and urology attempted to place a castellano for him but 

were unsuccessful. IR placed a suprapubic catheter with 400 ml of urine output 

during the procedure and 300 ml drained in the bag, making it about 700 that he 

had in his bladder. The CT adomen he had gotten in the ED did not show evidence 

of hydronephrosis, but only commented on extrarenal pelvices. I spoke with 

radiologist on call who confirms that there was no hydronephrosis on the scan.


Pt received a a total of 100 lasix and started making urine. He was on a 

dextrose drip and insulin drip for his hyperkalemia that eventually trended 

down on the next blood draw. He is coming up to the ICU unit for closer 

monitoring. 


Family History: Unchanged from Admission


Social History: Unchanged from Admission


Past Medical History: Unchanged from Admission





Review of Systems





- Measurements


Intake and Output: 


Intake and Output Last 24 Hours











 12/11/19 12/12/19 12/13/19 12/14/19





 06:59 06:59 06:59 06:59


 


Intake Total    1951


 


Output Total    620


 


Balance    1331


 


Weight    159 lb 14.4 oz


 


Intake:    


 


  IV Fluids    1951


 


Output:    


 


  Urine    200


 


  Castellano    420














- Review of Systems


General Comments: 





patient is not a good historia





Objective


Active Medications: 








Sodium Bicarbonate 150 meq/ (Dextrose)  1,000 mls @ 100 mls/hr IV Q10H ROBB


   Last Admin: 12/14/19 00:19 Dose:  100 mls/hr


Miscellaneous (Ativan Pyxis Key)  1 ea N/A .ATIVAN IV KEY PRN


   PRN Reason: PYXIS KEY


Pharmacy Consult (Zosyn Per Pharmacy*)  1 note FOLLOW UP . PRN


   PRN Reason: PER PROTOCOL








 Vital Signs - 8 hr











  12/13/19 12/13/19 12/13/19





  17:17 19:03 19:23


 


Temperature   


 


Pulse Rate 67 68 65


 


Respiratory 20 30 15





Rate   


 


Blood Pressure 138/79  127/36





(mmHg)   


 


O2 Sat by Pulse 100 100 100





Oximetry   














  12/13/19 12/13/19 12/13/19





  19:37 19:53 20:00


 


Temperature   


 


Pulse Rate 70 75 80


 


Respiratory 22 20 27





Rate   


 


Blood Pressure  133/68 





(mmHg)   


 


O2 Sat by Pulse 100 99 100





Oximetry   














  12/13/19 12/13/19 12/13/19





  20:14 20:28 20:43


 


Temperature   


 


Pulse Rate 84 86 85


 


Respiratory 23 25 24





Rate   


 


Blood Pressure 134/74 158/86 157/66





(mmHg)   


 


O2 Sat by Pulse 100 100 100





Oximetry   














  12/13/19 12/13/19 12/13/19





  20:59 21:00 21:13


 


Temperature   


 


Pulse Rate 88 87 87


 


Respiratory 23 19 18





Rate   


 


Blood Pressure 152/77  164/72





(mmHg)   


 


O2 Sat by Pulse 100 100 100





Oximetry   














  12/13/19 12/13/19 12/13/19





  21:29 21:42 22:00


 


Temperature   


 


Pulse Rate 84 83 77


 


Respiratory 23 21 21





Rate   


 


Blood Pressure 145/68 127/52 





(mmHg)   


 


O2 Sat by Pulse 98 100 99





Oximetry   














  12/13/19 12/13/19 12/13/19





  22:12 22:42 23:00


 


Temperature   


 


Pulse Rate 70 65 65


 


Respiratory 22 26 19





Rate   


 


Blood Pressure 111/57 109/60 





(mmHg)   


 


O2 Sat by Pulse 100 100 100





Oximetry   














  12/13/19 12/13/19 12/13/19





  23:13 23:43 23:48


 


Temperature   


 


Pulse Rate 65 61 63


 


Respiratory 16 21 23





Rate   


 


Blood Pressure 95/44 124/48 





(mmHg)   


 


O2 Sat by Pulse 100 100 98





Oximetry   














  12/13/19 12/14/19 12/14/19





  23:50 00:00 00:02


 


Temperature 98.2 F  98.2 F


 


Pulse Rate 63 66 63


 


Respiratory 20 20 21





Rate   


 


Blood Pressure 124/43  124/48





(mmHg)   


 


O2 Sat by Pulse 98 100 100





Oximetry   














  12/14/19 12/14/19 12/14/19





  00:04 00:16 00:30


 


Temperature   


 


Pulse Rate 70 63 62


 


Respiratory 18 20 24





Rate   


 


Blood Pressure 123/63 118/51 114/65





(mmHg)   


 


O2 Sat by Pulse 99 100 99





Oximetry   














  12/14/19





  00:45


 


Temperature 


 


Pulse Rate 62


 


Respiratory 21





Rate 


 


Blood Pressure 126/58





(mmHg) 


 


O2 Sat by Pulse 99





Oximetry 











Oxygen Devices in Use Now: None, Nasal Cannula


Appearance: alert, pleasant, good-spirited. Oriented to self but not to 

situation.


Eyes: No Scleral Icterus, PERRLA


Ears/Nose/Mouth/Throat: NL Teeth, Lips, Gums, Mucous Membranes Moist


Neck: NL Appearance and Movements; NL JVP, Trachea Midline, No Thyroid 

Enlargement, Masses


Respiratory: Symmetrical Chest Expansion and Respiratory Effort, - - crackles 

in the bases


Cardiovascular: NL Sounds; No Murmurs; No JVD, No Edema


Lymphatic: No Cervical Adenopathy


Skin: No Rash or Ulcers, No Nodules or Sclerosis


Neurological: - - alert and oriented to self


Result Diagrams: 


 12/14/19 04:50





 12/14/19 04:50





Assess/Plan/Problems-Billing


Assessment: 











- Patient Problems


(1) Acute renal failure


Current Visit: Yes   Status: Acute   Comment: the chronic retention cerntainly 

contributed to the renal failure but interestingly CT abdomen did not show 

evidence of hydronephrosis and he only had 700 cc of urine in bladder. 


Will put the pt on bicarb drip for the acidosis and give lasix to flush out the 

potassium 


BMP Q6H 


UA showed a UTI, ceftriaxone, blood cx pending    





(2) Urinary obstruction


Current Visit: No   Status: Acute   Comment: urology could not pass the castellano 

in therfore he needed to ahve a suprapubic catheter put in by IR   





(3) CAD (coronary artery disease)


Current Visit: No   Status: Acute   Code(s): I25.10 - ATHSCL HEART DISEASE OF 

NATIVE CORONARY ARTERY W/O ANG PCTRS   SNOMED Code(s): 60128964


   Comment: 


holding home meds   





(4) Hypertension


Current Visit: No   Status: Acute   Code(s): I10 - ESSENTIAL (PRIMARY) 

HYPERTENSION   SNOMED Code(s): 77617811


   Comment: his pressures have been soft, holding his home meds   





(5) Chronic diastolic (congestive) heart failure


Current Visit: No   Status: Chronic   Code(s): I50.32 - CHRONIC DIASTOLIC (

CONGESTIVE) HEART FAILURE   SNOMED Code(s): 783280873

## 2019-12-14 NOTE — PN
Date of Service: 12/14/19


Critical Care Services: 





Did well overnight, making urine. Potassium down.


Vital Signs: 











Temp Pulse Resp BP SpO2 FiO2


 


35.8 C 61 22 101/42 99 


 


12/14/19 07:48 12/14/19 06:01 12/14/19 06:01 12/14/19 06:01 12/14/19 06:01 











Physical Exam: 


Gen: NAD





HEENT: NCAT, PERRL





Lungs: scant crackles





Cardiac:   S1S2 regular





Abdomen: soft, NT, ND, +BS, suprapubic in place draining clear urine





Extremities: trace LE edema





Neuro: grossly non-focal, fluent,  mild confusion to reported baseline





Fluid Balance (Past 24 Hours): 


I=     O=     Net 


 Intake & Output











 12/12/19 12/13/19 12/14/19 12/15/19





 06:59 06:59 06:59 06:59


 


Intake Total   2694 


 


Output Total   1645 225


 


Balance   1049 -225


 


Weight   65.6 kg 


 


Intake:    


 


  IV Fluids   2514 


 


    D5W w/150meq HCO3/l   563 


 


  Oral   180 


 


Output:    


 


  Urine   200 


 


  Segura   1445 225








Labs: 


 Laboratory Results - last 24 hr











  12/13/19 12/13/19 12/13/19





  12:40 12:40 12:40


 


WBC  9.0  


 


RBC  2.93 L  


 


Hgb  9.9 L  


 


Hct  30 L  


 


MCV  103 H  


 


MCH  34 H  


 


MCHC  33  


 


RDW  16 H  


 


Plt Count  173  


 


MPV  10.9 H  


 


Neut % (Auto)  80.3  


 


Lymph % (Auto)  9.8  


 


Mono % (Auto)  8.9  


 


Eos % (Auto)  0.6  


 


Baso % (Auto)  0.4  


 


Absolute Neuts (auto)  7.2  


 


Absolute Lymphs (auto)  0.9 L  


 


Absolute Monos (auto)  0.8  


 


Absolute Eos (auto)  0.0  


 


Absolute Basos (auto)  0.0  


 


Absolute Nucleated RBC  0.1  


 


Nucleated RBC %  1.1  


 


INR (Anticoag Therapy)   


 


APTT   


 


VBG pH   


 


VBG pCO2   


 


VBG pO2   


 


VBG HCO3   


 


VBG O2 Saturation   


 


VBG Base Excess   


 


Sodium   134 L 


 


Potassium   TNP 


 


Chloride   117 H 


 


Carbon Dioxide   8 L* 


 


Anion Gap   9 


 


BUN   66 H 


 


Creatinine   3.98 H 


 


Est GFR ( Amer)   17.4 


 


Est GFR (Non-Af Amer)   14.4 


 


BUN/Creatinine Ratio   16.6 


 


Glucose   25 L* 


 


POC Glucose (mg/dL)   


 


Lactic Acid    0.8


 


Calcium   8.8 


 


Phosphorus   


 


Magnesium   


 


Total Bilirubin   0.20 


 


AST   TNP 


 


ALT   24 


 


Alkaline Phosphatase   228 H 


 


Troponin I   0.05 H* 


 


Total Protein   7.1 


 


Albumin   3.7 


 


Globulin   3.4 


 


Albumin/Globulin Ratio   1.1 


 


Urine Color   


 


Urine Appearance   


 


Urine pH   


 


Ur Specific Gravity   


 


Urine Protein   


 


Urine Ketones   


 


Urine Blood   


 


Urine Nitrate   


 


Urine Bilirubin   


 


Urine Urobilinogen   


 


Ur Leukocyte Esterase   


 


Urine WBC (Auto)   


 


Urine RBC (Auto)   


 


Urine Bacteria   


 


Urine Glucose   


 


Serum Alcohol   < 10 














  12/13/19 12/13/19 12/13/19





  12:40 14:34 15:12


 


WBC   


 


RBC   


 


Hgb   


 


Hct   


 


MCV   


 


MCH   


 


MCHC   


 


RDW   


 


Plt Count   


 


MPV   


 


Neut % (Auto)   


 


Lymph % (Auto)   


 


Mono % (Auto)   


 


Eos % (Auto)   


 


Baso % (Auto)   


 


Absolute Neuts (auto)   


 


Absolute Lymphs (auto)   


 


Absolute Monos (auto)   


 


Absolute Eos (auto)   


 


Absolute Basos (auto)   


 


Absolute Nucleated RBC   


 


Nucleated RBC %   


 


INR (Anticoag Therapy)   


 


APTT   


 


VBG pH   


 


VBG pCO2   


 


VBG pO2   


 


VBG HCO3   


 


VBG O2 Saturation   


 


VBG Base Excess   


 


Sodium   


 


Potassium    6.7 H*


 


Chloride   


 


Carbon Dioxide   


 


Anion Gap   


 


BUN   


 


Creatinine   


 


Est GFR ( Amer)   


 


Est GFR (Non-Af Amer)   


 


BUN/Creatinine Ratio   


 


Glucose   


 


POC Glucose (mg/dL)  44 L  138 H 


 


Lactic Acid   


 


Calcium   


 


Phosphorus   


 


Magnesium   


 


Total Bilirubin   


 


AST    35


 


ALT   


 


Alkaline Phosphatase   


 


Troponin I   


 


Total Protein   


 


Albumin   


 


Globulin   


 


Albumin/Globulin Ratio   


 


Urine Color   


 


Urine Appearance   


 


Urine pH   


 


Ur Specific Gravity   


 


Urine Protein   


 


Urine Ketones   


 


Urine Blood   


 


Urine Nitrate   


 


Urine Bilirubin   


 


Urine Urobilinogen   


 


Ur Leukocyte Esterase   


 


Urine WBC (Auto)   


 


Urine RBC (Auto)   


 


Urine Bacteria   


 


Urine Glucose   


 


Serum Alcohol   














  12/13/19 12/13/19 12/13/19





  16:10 16:10 16:50


 


WBC   


 


RBC   


 


Hgb   


 


Hct   


 


MCV   


 


MCH   


 


MCHC   


 


RDW   


 


Plt Count   


 


MPV   


 


Neut % (Auto)   


 


Lymph % (Auto)   


 


Mono % (Auto)   


 


Eos % (Auto)   


 


Baso % (Auto)   


 


Absolute Neuts (auto)   


 


Absolute Lymphs (auto)   


 


Absolute Monos (auto)   


 


Absolute Eos (auto)   


 


Absolute Basos (auto)   


 


Absolute Nucleated RBC   


 


Nucleated RBC %   


 


INR (Anticoag Therapy)    1.03


 


APTT    31.9


 


VBG pH   7.13 L 


 


VBG pCO2   29 L 


 


VBG pO2   < 38.0 


 


VBG HCO3   9.4 L 


 


VBG O2 Saturation   47.7 L 


 


VBG Base Excess   -18.2 L 


 


Sodium   


 


Potassium   


 


Chloride   


 


Carbon Dioxide   


 


Anion Gap   


 


BUN   


 


Creatinine   


 


Est GFR ( Amer)   


 


Est GFR (Non-Af Amer)   


 


BUN/Creatinine Ratio   


 


Glucose   


 


POC Glucose (mg/dL)   


 


Lactic Acid  1.1  


 


Calcium   


 


Phosphorus   


 


Magnesium   


 


Total Bilirubin   


 


AST   


 


ALT   


 


Alkaline Phosphatase   


 


Troponin I   


 


Total Protein   


 


Albumin   


 


Globulin   


 


Albumin/Globulin Ratio   


 


Urine Color   


 


Urine Appearance   


 


Urine pH   


 


Ur Specific Gravity   


 


Urine Protein   


 


Urine Ketones   


 


Urine Blood   


 


Urine Nitrate   


 


Urine Bilirubin   


 


Urine Urobilinogen   


 


Ur Leukocyte Esterase   


 


Urine WBC (Auto)   


 


Urine RBC (Auto)   


 


Urine Bacteria   


 


Urine Glucose   


 


Serum Alcohol   














  12/13/19 12/13/19 12/13/19





  17:11 19:39 20:52


 


WBC   


 


RBC   


 


Hgb   


 


Hct   


 


MCV   


 


MCH   


 


MCHC   


 


RDW   


 


Plt Count   


 


MPV   


 


Neut % (Auto)   


 


Lymph % (Auto)   


 


Mono % (Auto)   


 


Eos % (Auto)   


 


Baso % (Auto)   


 


Absolute Neuts (auto)   


 


Absolute Lymphs (auto)   


 


Absolute Monos (auto)   


 


Absolute Eos (auto)   


 


Absolute Basos (auto)   


 


Absolute Nucleated RBC   


 


Nucleated RBC %   


 


INR (Anticoag Therapy)   


 


APTT   


 


VBG pH   


 


VBG pCO2   


 


VBG pO2   


 


VBG HCO3   


 


VBG O2 Saturation   


 


VBG Base Excess   


 


Sodium    135


 


Potassium    5.7 H


 


Chloride    117 H


 


Carbon Dioxide    8 L*


 


Anion Gap    10


 


BUN    66 H


 


Creatinine    3.88 H


 


Est GFR ( Amer)    17.9


 


Est GFR (Non-Af Amer)    14.8


 


BUN/Creatinine Ratio    17.0


 


Glucose    191 H


 


POC Glucose (mg/dL)   69 L 


 


Lactic Acid   


 


Calcium    10.2


 


Phosphorus   


 


Magnesium   


 


Total Bilirubin   


 


AST   


 


ALT   


 


Alkaline Phosphatase   


 


Troponin I   


 


Total Protein   


 


Albumin   


 


Globulin   


 


Albumin/Globulin Ratio   


 


Urine Color  Yellow  


 


Urine Appearance  Cloudy  


 


Urine pH  5.0  


 


Ur Specific [place]  1.011  


 


Urine Protein  2+(100 mg/dl) A  


 


Urine Ketones  Negative  


 


Urine Blood  3+ A  


 


Urine Nitrate  Negative  


 


Urine Bilirubin  Negative  


 


Urine Urobilinogen  Negative  


 


Ur Leukocyte Esterase  3+ A  


 


Urine WBC (Auto)  3+(>20/hpf) A  


 


Urine RBC (Auto)  3+(>10/hpf) A  


 


Urine Bacteria  1+ A  


 


Urine Glucose  Negative  


 


Serum Alcohol   














  12/13/19 12/14/19 12/14/19





  22:19 00:42 01:50


 


WBC   


 


RBC   


 


Hgb   


 


Hct   


 


MCV   


 


MCH   


 


MCHC   


 


RDW   


 


Plt Count   


 


MPV   


 


Neut % (Auto)   


 


Lymph % (Auto)   


 


Mono % (Auto)   


 


Eos % (Auto)   


 


Baso % (Auto)   


 


Absolute Neuts (auto)   


 


Absolute Lymphs (auto)   


 


Absolute Monos (auto)   


 


Absolute Eos (auto)   


 


Absolute Basos (auto)   


 


Absolute Nucleated RBC   


 


Nucleated RBC %   


 


INR (Anticoag Therapy)   


 


APTT   


 


VBG pH  7.17 L  


 


VBG pCO2  27 L  


 


VBG pO2  < 38.0  


 


VBG HCO3  10.5 L  


 


VBG O2 Saturation  61.1 L  


 


VBG Base Excess  -17.2 L  


 


Sodium    132 L


 


Potassium    5.7 H


 


Chloride    114 H


 


Carbon Dioxide    10 L*


 


Anion Gap    8


 


BUN    60 H


 


Creatinine    3.76 H


 


Est GFR ( Amer)    18.5


 


Est GFR (Non-Af Amer)    15.3


 


BUN/Creatinine Ratio    16.0


 


Glucose    116 H


 


POC Glucose (mg/dL)   122 H 


 


Lactic Acid   


 


Calcium    8.9


 


Phosphorus   


 


Magnesium   


 


Total Bilirubin   


 


AST   


 


ALT   


 


Alkaline Phosphatase   


 


Troponin I   


 


Total Protein   


 


Albumin   


 


Globulin   


 


Albumin/Globulin Ratio   


 


Urine Color   


 


Urine Appearance   


 


Urine pH   


 


Ur Specific Gravity   


 


Urine Protein   


 


Urine Ketones   


 


Urine Blood   


 


Urine Nitrate   


 


Urine Bilirubin   


 


Urine Urobilinogen   


 


Ur Leukocyte Esterase   


 


Urine WBC (Auto)   


 


Urine RBC (Auto)   


 


Urine Bacteria   


 


Urine Glucose   


 


Serum Alcohol   














  12/14/19 12/14/19 12/14/19





  04:50 04:50 08:04


 


WBC  8.7  


 


RBC  2.49 L  


 


Hgb  8.3 L  


 


Hct  25 L  


 


MCV  100 H  


 


MCH  33 H  


 


MCHC  33  


 


RDW  15  


 


Plt Count  129 L  


 


MPV  10.6 H  


 


Neut % (Auto)   


 


Lymph % (Auto)   


 


Mono % (Auto)   


 


Eos % (Auto)   


 


Baso % (Auto)   


 


Absolute Neuts (auto)   


 


Absolute Lymphs (auto)   


 


Absolute Monos (auto)   


 


Absolute Eos (auto)   


 


Absolute Basos (auto)   


 


Absolute Nucleated RBC   


 


Nucleated RBC %   


 


INR (Anticoag Therapy)   


 


APTT   


 


VBG pH   


 


VBG pCO2   


 


VBG pO2   


 


VBG HCO3   


 


VBG O2 Saturation   


 


VBG Base Excess   


 


Sodium   133 L 


 


Potassium   5.4 H 


 


Chloride   112 H 


 


Carbon Dioxide   10 L* 


 


Anion Gap   11 


 


BUN   60 H 


 


Creatinine   3.71 H 


 


Est GFR ( Amer)   18.8 


 


Est GFR (Non-Af Amer)   15.6 


 


BUN/Creatinine Ratio   16.2 


 


Glucose   110 H 


 


POC Glucose (mg/dL)    127 H


 


Lactic Acid   


 


Calcium   8.6 


 


Phosphorus   5.2 H 


 


Magnesium   1.2 L 


 


Total Bilirubin   


 


AST   


 


ALT   


 


Alkaline Phosphatase   


 


Troponin I   


 


Total Protein   


 


Albumin   


 


Globulin   


 


Albumin/Globulin Ratio   


 


Urine Color   


 


Urine Appearance   


 


Urine pH   


 


Ur Specific Gravity   


 


Urine Protein   


 


Urine Ketones   


 


Urine Blood   


 


Urine Nitrate   


 


Urine Bilirubin   


 


Urine Urobilinogen   


 


Ur Leukocyte Esterase   


 


Urine WBC (Auto)   


 


Urine RBC (Auto)   


 


Urine Bacteria   


 


Urine Glucose   


 


Serum Alcohol   











Studies: 





CXR with mild pulmonary edema


Nutrition: 





Eating


Impression: 





88 y/o male presents with hyperkalemia, acute renal failure secondary to 

obstructive uropathy. Now S/P suprapubic tube by IR.


Plan: 


Obstructive uropathy - treated with suprapubic tube. 1600cc UO overnight. Long 

term management per .





Acute Renal failure - secondary to #1, improving steadily with solid UO.





Hyperkalemia - secondary to #1 and #2 resolved with UO.





Hyperphosphatemia - secondary to #1 and #2. Resolving with UO.





Mild Pulmonary edema - secondary to #1 and #2, BP soft, will not bolus at this 

time to accelerate solute clearance nor diurese due to the BP. Numbers are 

correcting and his respiratory status is good on NC with no excess WOB.





Hypomagnesemia - repleted





Likely to floor later today.

## 2019-12-15 LAB
ANION GAP SERPL CALC-SCNC: 6 MMOL/L (ref 2–11)
BUN SERPL-MCNC: 52 MG/DL (ref 6–24)
BUN/CREAT SERPL: 16.2 (ref 8–20)
CALCIUM SERPL-MCNC: 7.4 MG/DL (ref 8.6–10.3)
CHLORIDE SERPL-SCNC: 107 MMOL/L (ref 101–111)
GLUCOSE SERPL-MCNC: 101 MG/DL (ref 70–100)
HCO3 SERPL-SCNC: 24 MMOL/L (ref 22–32)
MAGNESIUM SERPL-MCNC: 1.3 MG/DL (ref 1.9–2.7)
POTASSIUM SERPL-SCNC: 4.2 MMOL/L (ref 3.5–5)
SODIUM SERPL-SCNC: 137 MMOL/L (ref 135–145)

## 2019-12-15 RX ADMIN — PANTOPRAZOLE SODIUM SCH MG: 40 TABLET, DELAYED RELEASE ORAL at 09:18

## 2019-12-15 RX ADMIN — AMOXICILLIN AND CLAVULANATE POTASSIUM SCH MG: 500; 125 TABLET, FILM COATED ORAL at 21:00

## 2019-12-15 RX ADMIN — CALCIUM CARBONATE (ANTACID) CHEW TAB 500 MG SCH MG: 500 CHEW TAB at 09:18

## 2019-12-15 RX ADMIN — HEPARIN SODIUM SCH UNITS: 5000 INJECTION INTRAVENOUS; SUBCUTANEOUS at 21:01

## 2019-12-15 RX ADMIN — HYDROXYCHLOROQUINE SULFATE SCH MG: 200 TABLET, FILM COATED ORAL at 05:25

## 2019-12-15 RX ADMIN — AMOXICILLIN AND CLAVULANATE POTASSIUM SCH MG: 500; 125 TABLET, FILM COATED ORAL at 09:17

## 2019-12-15 RX ADMIN — ATORVASTATIN CALCIUM SCH MG: 10 TABLET, FILM COATED ORAL at 09:17

## 2019-12-15 RX ADMIN — METOPROLOL SUCCINATE SCH: 25 TABLET, EXTENDED RELEASE ORAL at 10:49

## 2019-12-15 RX ADMIN — SODIUM BICARBONATE SCH MLS/HR: 84 INJECTION, SOLUTION INTRAVENOUS at 05:37

## 2019-12-15 RX ADMIN — CALCIUM CARBONATE (ANTACID) CHEW TAB 500 MG SCH MG: 500 CHEW TAB at 21:00

## 2019-12-15 RX ADMIN — ASPIRIN SCH MG: 81 TABLET, COATED ORAL at 09:17

## 2019-12-15 RX ADMIN — METOPROLOL SUCCINATE SCH: 25 TABLET, EXTENDED RELEASE ORAL at 21:00

## 2019-12-15 RX ADMIN — HEPARIN SODIUM SCH UNITS: 5000 INJECTION INTRAVENOUS; SUBCUTANEOUS at 14:10

## 2019-12-15 RX ADMIN — Medication SCH UNITS: at 09:18

## 2019-12-15 NOTE — PN
Subjective


Date of Service: 12/15/19


Interval History: 





Pt is feeling well. No c/o chest pain or SOB. Mild tenderness around the 

suprapubic catheter site. No overnight events reported. 





Objective


Active Medications: 








Aspirin (Aspirin Ec Tab*)  81 mg PO DAILY [facility]


Atorvastatin Calcium (Lipitor*)  10 mg PO DAILY [facility]


   Last Admin: 12/14/19 12:30 Dose:  10 mg


Calcium Carbonate (Tums*)  500 mg PO BID [facility]


   Last Admin: 12/14/19 19:55 Dose:  500 mg


Cholecalciferol (Vitamin D Tab*)  1,000 units PO DAILY [facility]


Hydroxychloroquine Sulfate (Plaquenil Tab*)  200 mg PO DAILY@0600 [facility]


   Last Admin: 12/15/19 05:25 Dose:  200 mg


Piperacillin Sod/Tazobactam (Sod 3.375 gm/ Sodium Chloride)  100 mls @ 25 mls/

hr IVPB Q12H [facility]


   Last Admin: 12/15/19 04:37 Dose:  25 mls/hr


Metoprolol Succinate (Toprol Xl Tab*)  25 mg PO BID [facility]


   Last Admin: 12/14/19 21:35 Dose:  25 mg


Miscellaneous (Ativan Pyxis Miller)  1 ea N/A .ATIVAN IV KEY PRN


   PRN Reason: PYXIS KEY


Pantoprazole Sodium (Protonix Tab*)  40 mg PO DAILY [facility]


   Last Admin: 12/14/19 12:30 Dose:  40 mg


Pharmacy Consult (Zosyn Per Pharmacy*)  1 note FOLLOW UP .ZOSYN PER PHARMACY [facility]


Trimethoprim/Sulfamethoxazole (Bactrim Ss 400/80 Tab*)  1 tab PO BID [facility]


   Last Admin: 12/14/19 20:56 Dose:  1 tab








 Vital Signs - 8 hr











  12/15/19 12/15/19 12/15/19





  01:00 02:00 03:00


 


Temperature   


 


Pulse Rate 66 66 63


 


Respiratory 15 14 12





Rate   


 


Blood Pressure   





(mmHg)   


 


O2 Sat by Pulse 95 96 94





Oximetry   














  12/15/19 12/15/19 12/15/19





  04:00 05:00 06:00


 


Temperature 97.5 F  


 


Pulse Rate 64 68 64


 


Respiratory 12 19 13





Rate   


 


Blood Pressure 116/58  





(mmHg)   


 


O2 Sat by Pulse 97 96 94





Oximetry   














  12/15/19 12/15/19





  06:08 07:00


 


Temperature  


 


Pulse Rate 66 67


 


Respiratory 20 12





Rate  


 


Blood Pressure 113/56 





(mmHg)  


 


O2 Sat by Pulse 96 96





Oximetry  











Oxygen Devices in Use Now: None


Appearance: Elderly male sitting up in bed, awake, NAD


Eyes: No Scleral Icterus


Ears/Nose/Mouth/Throat: Mucous Membranes Moist


Respiratory: Symmetrical Chest Expansion and Respiratory Effort, Clear to 

Auscultation - anteriorly


Cardiovascular: NL Sounds; No Murmurs; No JVD, RRR, - - 1+ edema on dorsum of 

feet


Abdominal: NL Sounds; No Tenderness; No Distention


Extremities: No Clubbing, Cyanosis


Skin: No Nodules or Sclerosis


Neurological: Alert and Oriented x 3, - - speech is mildly dysarthric


Result Diagrams: 


 12/14/19 04:50





 12/15/19 05:00


Microbiology and Other Data: 


 Microbiology











 12/13/19 17:11 Urine Culture - Preliminary





 Urine    Enterococcus Faecalis


 


 12/13/19 13:01 Aerobic Blood Culture - Preliminary





 Blood Venous    No Growth Day 1





 Anaerobic Blood Culture - Preliminary





    No Growth Day 1


 


 12/14/19 00:00 Nasal Screen MRSA (PCR) - Final





 Nasal    Mrsa Not Detected














Assess/Plan/Problems-Billing


Mr Vazquez is an 86 yo M who has a h/o chronic systolic CHF, CAD, ILD, mixed 

connective tissue disorder, HTN, CKD stage III and atrial fibrillation who 

presented to the ER after being found unresponsive by his wife and was found to 

be markedly hypoglycemic and in ARF with urinary retention. 





- Patient Problems


(1) Altered mental status


Current Visit: Yes   Status: Acute   Code(s): R41.82 - ALTERED MENTAL STATUS, 

UNSPECIFIED   SNOMED Code(s): 547758458


   Comment: Pt unresponsive on admission felt to be secondary to hypoglycemia. 

Back to baseline mental status now.    





(2) Urinary tract infection


Current Visit: Yes   Status: Acute   Comment: Pt with UTI diagnosed initially 

as outpatient. Culture grew enterococcus faecalis. He had been on bactrim- 

sensitivity not reported for this Abx and despite being on treatment for 

several days with bactrim, culture on admission again grew enterococcus 

faecalis. Will stop bactrim which had been restarted yesterday and stop zosyn 

and change to augmentin as his isolate is PCN sensitive.    





(3) Acute renal failure superimposed on stage 3 chronic kidney disease


Current Visit: Yes   Status: Acute   Code(s): N17.9 - ACUTE KIDNEY FAILURE, 

UNSPECIFIED; N18.3 - CHRONIC KIDNEY DISEASE, STAGE 3 (MODERATE)   SNOMED Code(s)

: 231292381


   Comment: Pt's baseline creatinine slightly difficult to determine but likely 

around 1.8-2.3. On presentation the patient's creatinine was up to 3.98. ARF 

felt to be secondary to obstructive uropathy. He required suprapubic catheter 

placement. Creatinine has since been steadily decreasing but not yet back to 

his baseline. Will need definitive treatment by urology as outpatient for his 

BPH. Pt was also quite acidotic on admission, treated by bicarb drip. Acidosis 

has resolved.    





(4) Acute on chronic systolic (congestive) heart failure


Current Visit: Yes   Status: Acute   Code(s): I50.23 - ACUTE ON CHRONIC 

SYSTOLIC (CONGESTIVE) HEART FAILURE   SNOMED Code(s): 989788007


   Comment: Pt with marked LE edema and pulmonary edema on presentation. 

Resolved with relief of urinary obstruction. He has not been treated with 

diuretic. He is not requiring any supplemental O2. BP remains soft. Will hold 

off on resuming home diuretic therapy of lasix 60mg daily for now.    





(5) Afib


Current Visit: Yes   Status: Acute   Code(s): I48.91 - UNSPECIFIED ATRIAL 

FIBRILLATION   SNOMED Code(s): 15506185


   Comment: Pt paced on tele. Continue metoprolol XL.    





(6) CAD (coronary artery disease)


Current Visit: Yes   Status: Acute   Code(s): I25.10 - ATHSCL HEART DISEASE OF 

NATIVE CORONARY ARTERY W/O ANG PCTRS   SNOMED Code(s): 79751599


   Comment: No c/o chest pain. Troponin mildly elevated on admission to 0.05. 

Will add on to today's labs to ensure it is not much higher. Continue ASA, 

lipitor and metoprolol.    





(7) Hypertension


Current Visit: Yes   Status: Acute   Code(s): I10 - ESSENTIAL (PRIMARY) 

HYPERTENSION   SNOMED Code(s): 45057838


   Comment: BP remains low normal despite having metoprolol XL added back. Will 

continue to hold lasix for now but likely resume in next 24hr.    





(8) Connective tissue disorder


Current Visit: Yes   Status: Acute   Code(s): M35.9 - SYSTEMIC INVOLVEMENT OF 

CONNECTIVE TISSUE, UNSPECIFIED   SNOMED Code(s): 850427386


   Comment: Continue plaquenil.    





(9) Dyslipidemia


Current Visit: Yes   Status: Acute   Code(s): E78.5 - HYPERLIPIDEMIA, 

UNSPECIFIED   SNOMED Code(s): 112616071


   Comment: Continue lipitor.    





(10) DVT prophylaxis


Current Visit: Yes   Status: Acute   Code(s): BPW4276 -    SNOMED Code(s): 

783318268


   Comment: start SQ heparin

## 2019-12-16 RX ADMIN — HEPARIN SODIUM SCH UNITS: 5000 INJECTION INTRAVENOUS; SUBCUTANEOUS at 20:33

## 2019-12-16 RX ADMIN — AMOXICILLIN AND CLAVULANATE POTASSIUM SCH MG: 500; 125 TABLET, FILM COATED ORAL at 20:31

## 2019-12-16 RX ADMIN — HEPARIN SODIUM SCH UNITS: 5000 INJECTION INTRAVENOUS; SUBCUTANEOUS at 05:25

## 2019-12-16 RX ADMIN — METOPROLOL SUCCINATE SCH MG: 25 TABLET, EXTENDED RELEASE ORAL at 21:23

## 2019-12-16 RX ADMIN — Medication SCH UNITS: at 09:08

## 2019-12-16 RX ADMIN — AMOXICILLIN AND CLAVULANATE POTASSIUM SCH MG: 500; 125 TABLET, FILM COATED ORAL at 09:08

## 2019-12-16 RX ADMIN — CALCIUM CARBONATE (ANTACID) CHEW TAB 500 MG SCH MG: 500 CHEW TAB at 20:31

## 2019-12-16 RX ADMIN — ATORVASTATIN CALCIUM SCH MG: 10 TABLET, FILM COATED ORAL at 09:08

## 2019-12-16 RX ADMIN — HYDROXYCHLOROQUINE SULFATE SCH MG: 200 TABLET, FILM COATED ORAL at 05:48

## 2019-12-16 RX ADMIN — METOPROLOL SUCCINATE SCH: 25 TABLET, EXTENDED RELEASE ORAL at 09:01

## 2019-12-16 RX ADMIN — CALCIUM CARBONATE (ANTACID) CHEW TAB 500 MG SCH MG: 500 CHEW TAB at 09:08

## 2019-12-16 RX ADMIN — ASPIRIN SCH MG: 81 TABLET, COATED ORAL at 09:07

## 2019-12-16 RX ADMIN — PANTOPRAZOLE SODIUM SCH MG: 40 TABLET, DELAYED RELEASE ORAL at 09:08

## 2019-12-16 RX ADMIN — HEPARIN SODIUM SCH UNITS: 5000 INJECTION INTRAVENOUS; SUBCUTANEOUS at 14:05

## 2019-12-16 NOTE — PN
Subjective


Date of Service: 12/16/19


Interval History: 





Pt states he is feeling ok but c/o achiness all over. This is usual for him per 

his wife. He denies any SOB. His wife notes that the L arm is swollen but 

typically becomes swollen when in the hospital. She also states his speech is 

still garbled. She is having a hard time understanding him at times. 


Family History: Unchanged from Admission


Social History: Unchanged from Admission


Past Medical History: Unchanged from Admission





Objective


Active Medications: 








Amoxicillin/Clavulanate Potassium (Augmentin Tab*)  500 mg PO BID [facility]


   Last Admin: 12/16/19 09:08 Dose:  500 mg


Aspirin (Aspirin Ec Tab*)  81 mg PO DAILY [facility]


   Last Admin: 12/16/19 09:07 Dose:  81 mg


Atorvastatin Calcium (Lipitor*)  10 mg PO DAILY [facility]


   Last Admin: 12/16/19 09:08 Dose:  10 mg


Calcium Carbonate (Tums*)  500 mg PO BID [facility]


   Last Admin: 12/16/19 09:08 Dose:  500 mg


Cholecalciferol (Vitamin D Tab*)  1,000 units PO DAILY [facility]


   Last Admin: 12/16/19 09:08 Dose:  1,000 units


Heparin Sodium (Porcine) (Heparin Vial(*))  5,000 units SUBCUT Q8HR [facility]


   Last Admin: 12/16/19 05:25 Dose:  5,000 units


Hydroxychloroquine Sulfate (Plaquenil Tab*)  200 mg PO DAILY@0600 [facility]


   Last Admin: 12/16/19 05:48 Dose:  200 mg


Metoprolol Succinate (Toprol Xl Tab*)  25 mg PO BID [facility]


   Last Admin: 12/16/19 09:01 Dose:  Not Given


Miscellaneous (Ativan Pyxis Miller)  1 ea N/A .ATIVAN IV KEY PRN


   PRN Reason: PYXIS KEY


Pantoprazole Sodium (Protonix Tab*)  40 mg PO DAILY [facility]


   Last Admin: 12/16/19 09:08 Dose:  40 mg








 Vital Signs - 8 hr











  12/16/19 12/16/19 12/16/19





  07:23 09:00 11:41


 


Temperature 98.1 F  97.4 F


 


Pulse Rate 81  75


 


Respiratory 16 17 17





Rate   


 


Blood Pressure 102/38  121/48





(mmHg)   


 


O2 Sat by Pulse 95  98





Oximetry   











Oxygen Devices in Use Now: None


Appearance: Elderly male sitting up in bed, NAD


Eyes: No Scleral Icterus


Ears/Nose/Mouth/Throat: Mucous Membranes Moist


Respiratory: Symmetrical Chest Expansion and Respiratory Effort, Clear to 

Auscultation


Cardiovascular: NL Sounds; No Murmurs; No JVD, RRR, No Edema


Abdominal: NL Sounds; No Tenderness; No Distention, - - suprapubic catheter in 

place


Extremities: No Clubbing, Cyanosis


Skin: No Nodules or Sclerosis


Neurological: Alert and Oriented x 3, - - mildly dysarthric speech


Result Diagrams: 


 12/14/19 04:50





 12/15/19 05:00


Microbiology and Other Data: 


 Microbiology











 12/13/19 17:11 Urine Culture - Preliminary





 Urine    Enterococcus Faecalis


 


 12/13/19 13:01 Aerobic Blood Culture - Preliminary





 Blood Venous    No Growth Day 1





 Anaerobic Blood Culture - Preliminary





    No Growth Day 1


 


 12/14/19 00:00 Nasal Screen MRSA (PCR) - Final





 Nasal    Mrsa Not Detected














Assess/Plan/Problems-Billing


Mr Vazquez is an 86 yo M who has a h/o chronic systolic CHF, CAD, ILD, mixed 

connective tissue disorder, HTN, CKD stage III and atrial fibrillation who 

presented to the ER after being found unresponsive by his wife and was found to 

be markedly hypoglycemic and in ARF with urinary retention. 





- Patient Problems


(1) Altered mental status


Current Visit: Yes   Status: Acute   Code(s): R41.82 - ALTERED MENTAL STATUS, 

UNSPECIFIED   SNOMED Code(s): 320837820


   Comment: Pt unresponsive on admission felt to be secondary to hypoglycemia. 

Back to baseline mental status now. Pt has been increasingly sleepy at home. No 

clear cause. PT eval for weakness. At baseline he transfers to wheelchair and 

then can stand to clean up.    





(2) Urinary tract infection


Current Visit: Yes   Status: Acute   Comment: Continue augmentin for another 6 

days.    





(3) Acute renal failure superimposed on stage 3 chronic kidney disease


Current Visit: Yes   Status: Acute   Code(s): N17.9 - ACUTE KIDNEY FAILURE, 

UNSPECIFIED; N18.3 - CHRONIC KIDNEY DISEASE, STAGE 3 (MODERATE)   SNOMED Code(s)

: 591254029


   Comment: Creatinine was not checked today. Will obtain BMP tomorrow.    





(4) Acute on chronic systolic (congestive) heart failure


Current Visit: Yes   Status: Acute   Code(s): I50.23 - ACUTE ON CHRONIC 

SYSTOLIC (CONGESTIVE) HEART FAILURE   SNOMED Code(s): 608438326


   Comment: No LE edema at this time. Will continue to hold lasix for now as pt 

appears euvolemic.    





(5) Afib


Current Visit: Yes   Status: Acute   Code(s): I48.91 - UNSPECIFIED ATRIAL 

FIBRILLATION   SNOMED Code(s): 75241335


   Comment: Pt regular on exam. Continue metoprolol XL.    





(6) CAD (coronary artery disease)


Current Visit: Yes   Status: Acute   Code(s): I25.10 - ATHSCL HEART DISEASE OF 

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


   Comment: No c/o chest pain. Troponin mildly elevated on admission to 0.05. 

Will add on to today's labs to ensure it is not much higher. Continue ASA, 

lipitor and metoprolol.    





(7) Hypertension


Current Visit: Yes   Status: Acute   Code(s): I10 - ESSENTIAL (PRIMARY) 

HYPERTENSION   SNOMED Code(s): 72564345


   Comment: BP remains low normal despite having metoprolol XL added back. Will 

continue to hold lasix for now.   





(8) Connective tissue disorder


Current Visit: Yes   Status: Acute   Code(s): M35.9 - SYSTEMIC INVOLVEMENT OF 

CONNECTIVE TISSUE, UNSPECIFIED   SNOMED Code(s): 745388077


   Comment: Continue plaquenil.    





(9) Dyslipidemia


Current Visit: Yes   Status: Acute   Code(s): E78.5 - HYPERLIPIDEMIA, 

UNSPECIFIED   SNOMED Code(s): 566636974


   Comment: Continue lipitor.    





(10) DVT prophylaxis


Current Visit: Yes   Status: Acute   Code(s): JDE8972 -    SNOMED Code(s): 

249764396


   Comment: SQ heparin

## 2019-12-17 LAB
ANION GAP SERPL CALC-SCNC: 6 MMOL/L (ref 2–11)
BUN SERPL-MCNC: 42 MG/DL (ref 6–24)
BUN/CREAT SERPL: 13.4 (ref 8–20)
CALCIUM SERPL-MCNC: 7.9 MG/DL (ref 8.6–10.3)
CHLORIDE SERPL-SCNC: 104 MMOL/L (ref 101–111)
GLUCOSE SERPL-MCNC: 86 MG/DL (ref 70–100)
HCO3 SERPL-SCNC: 27 MMOL/L (ref 22–32)
HCT VFR BLD AUTO: 23 % (ref 42–52)
HGB BLD-MCNC: 8 G/DL (ref 14–18)
MCH RBC QN AUTO: 34 PG (ref 27–31)
MCHC RBC AUTO-ENTMCNC: 34 G/DL (ref 31–36)
MCV RBC AUTO: 99 FL (ref 80–94)
PLATELET # BLD AUTO: 110 10^3/UL (ref 150–450)
POTASSIUM SERPL-SCNC: 3.9 MMOL/L (ref 3.5–5)
RBC # BLD AUTO: 2.37 10^6 /UL (ref 4.18–5.48)
SODIUM SERPL-SCNC: 137 MMOL/L (ref 135–145)
WBC # BLD AUTO: 7.9 10^3/UL (ref 3.5–10.8)

## 2019-12-17 RX ADMIN — CALCIUM CARBONATE (ANTACID) CHEW TAB 500 MG SCH MG: 500 CHEW TAB at 09:26

## 2019-12-17 RX ADMIN — ATORVASTATIN CALCIUM SCH MG: 10 TABLET, FILM COATED ORAL at 09:26

## 2019-12-17 RX ADMIN — HEPARIN SODIUM SCH UNITS: 5000 INJECTION INTRAVENOUS; SUBCUTANEOUS at 05:12

## 2019-12-17 RX ADMIN — HEPARIN SODIUM SCH: 5000 INJECTION INTRAVENOUS; SUBCUTANEOUS at 21:30

## 2019-12-17 RX ADMIN — HYDROXYCHLOROQUINE SULFATE SCH MG: 200 TABLET, FILM COATED ORAL at 05:13

## 2019-12-17 RX ADMIN — HEPARIN SODIUM SCH UNITS: 5000 INJECTION INTRAVENOUS; SUBCUTANEOUS at 13:23

## 2019-12-17 RX ADMIN — CALCIUM CARBONATE (ANTACID) CHEW TAB 500 MG SCH MG: 500 CHEW TAB at 21:23

## 2019-12-17 RX ADMIN — HEPARIN SODIUM SCH UNITS: 5000 INJECTION INTRAVENOUS; SUBCUTANEOUS at 21:23

## 2019-12-17 RX ADMIN — METOPROLOL SUCCINATE SCH: 25 TABLET, EXTENDED RELEASE ORAL at 21:22

## 2019-12-17 RX ADMIN — Medication SCH UNITS: at 09:26

## 2019-12-17 RX ADMIN — PANTOPRAZOLE SODIUM SCH MG: 40 TABLET, DELAYED RELEASE ORAL at 09:26

## 2019-12-17 RX ADMIN — METOPROLOL SUCCINATE SCH MG: 25 TABLET, EXTENDED RELEASE ORAL at 09:26

## 2019-12-17 RX ADMIN — AMOXICILLIN AND CLAVULANATE POTASSIUM SCH MG: 500; 125 TABLET, FILM COATED ORAL at 21:23

## 2019-12-17 RX ADMIN — ASPIRIN SCH MG: 81 TABLET, COATED ORAL at 09:26

## 2019-12-17 RX ADMIN — AMOXICILLIN AND CLAVULANATE POTASSIUM SCH MG: 500; 125 TABLET, FILM COATED ORAL at 09:25

## 2019-12-17 NOTE — PN
Subjective


Date of Service: 12/17/19


Interval History: 





Pt is feeling well today. He states he was able to get up to the chair but it 

was not very easy. He denies any SOB. No pain. He says he has been up in the 

chair most of the day. 


Family History: Unchanged from Admission


Social History: Unchanged from Admission


Past Medical History: Unchanged from Admission





Objective


Active Medications: 








Amoxicillin/Clavulanate Potassium (Augmentin Tab*)  500 mg PO BID [facility]


   Last Admin: 12/17/19 09:25 Dose:  500 mg


Aspirin (Aspirin Ec Tab*)  81 mg PO DAILY [facility]


   Last Admin: 12/17/19 09:26 Dose:  81 mg


Atorvastatin Calcium (Lipitor*)  10 mg PO DAILY [facility]


   Last Admin: 12/17/19 09:26 Dose:  10 mg


Calcium Carbonate (Tums*)  500 mg PO BID [facility]


   Last Admin: 12/17/19 09:26 Dose:  500 mg


Cholecalciferol (Vitamin D Tab*)  1,000 units PO DAILY [facility]


   Last Admin: 12/17/19 09:26 Dose:  1,000 units


Heparin Sodium (Porcine) (Heparin Vial(*))  5,000 units SUBCUT Q8HR [facility]


   Last Admin: 12/17/19 13:23 Dose:  5,000 units


Hydroxychloroquine Sulfate (Plaquenil Tab*)  200 mg PO DAILY@0600 [facility]


   Last Admin: 12/17/19 05:13 Dose:  200 mg


Metoprolol Succinate (Toprol Xl Tab*)  25 mg PO BID [facility]


   Last Admin: 12/17/19 09:26 Dose:  25 mg


Miscellaneous (Ativan Pyxis Miller)  1 ea N/A .ATIVAN IV KEY PRN


   PRN Reason: PYXIS KEY


Pantoprazole Sodium (Protonix Tab*)  40 mg PO DAILY [facility]


   Last Admin: 12/17/19 09:26 Dose:  40 mg








 Vital Signs - 8 hr











  12/17/19 12/17/19 12/17/19





  07:15 08:00 10:11


 


Temperature 98.0 F  


 


Pulse Rate 79  


 


Respiratory 16 18 18





Rate   


 


Blood Pressure 117/62  





(mmHg)   


 


O2 Sat by Pulse 95  





Oximetry   














  12/17/19





  11:15


 


Temperature 98.0 F


 


Pulse Rate 80


 


Respiratory 16





Rate 


 


Blood Pressure 114/61





(mmHg) 


 


O2 Sat by Pulse 95





Oximetry 











Oxygen Devices in Use Now: None


Appearance: Elderly male sitting up in a chair, NAD


Eyes: No Scleral Icterus


Ears/Nose/Mouth/Throat: Mucous Membranes Moist


Respiratory: Symmetrical Chest Expansion and Respiratory Effort, Clear to 

Auscultation - few bibasilar crackles


Cardiovascular: NL Sounds; No Murmurs; No JVD, RRR, - - 1+ LE edema


Abdominal: NL Sounds; No Tenderness; No Distention


Extremities: No Clubbing, Cyanosis


Skin: No Nodules or Sclerosis


Neurological: Alert and Oriented x 3


Result Diagrams: 


 12/17/19 06:24





 12/17/19 06:24


Microbiology and Other Data: 


 Microbiology











 12/13/19 17:11 Urine Culture - Preliminary





 Urine    Enterococcus Faecalis


 


 12/13/19 13:01 Aerobic Blood Culture - Preliminary





 Blood Venous    No Growth Day 1





 Anaerobic Blood Culture - Preliminary





    No Growth Day 1


 


 12/14/19 00:00 Nasal Screen MRSA (PCR) - Final





 Nasal    Mrsa Not Detected














Assess/Plan/Problems-Billing


Mr Vazquez is an 88 yo M who has a h/o chronic systolic CHF, CAD, ILD, mixed 

connective tissue disorder, HTN, CKD stage III and atrial fibrillation who 

presented to the ER after being found unresponsive by his wife and was found to 

be markedly hypoglycemic and in ARF with urinary retention. 





- Patient Problems


(1) Altered mental status


Current Visit: Yes   Status: Acute   Code(s): R41.82 - ALTERED MENTAL STATUS, 

UNSPECIFIED   SNOMED Code(s): 373196536


   Comment: Pt unresponsive on admission felt to be secondary to hypoglycemia. 

Back to baseline mental status now. Pt has been increasingly sleepy at home. No 

clear cause. Nocturnal desaturation study showed pt had minimal desaturation. 

He does not qualify for nocturnal O2. PT eval shows pt would benefit from 

skilled PT.    





(2) Urinary tract infection


Current Visit: Yes   Status: Acute   Comment: Continue augmentin for another 5 

days.    





(3) Acute renal failure superimposed on stage 3 chronic kidney disease


Current Visit: Yes   Status: Acute   Code(s): N17.9 - ACUTE KIDNEY FAILURE, 

UNSPECIFIED; N18.3 - CHRONIC KIDNEY DISEASE, STAGE 3 (MODERATE)   SNOMED Code(s)

: 560040289


   Comment: Creatinine down slightly from 2 days ago. Question component of ATN 

given slow improvment in renal function. Continue suprapubic catheter for 

obstructive uropathy. Will restart lasix today as he is now showing signs of 

fluid overload (crackles on exam/LE edema). Repeat BMP tomorrow. May need 

nephrology input if creatinine worsens.    





(4) Acute on chronic systolic (congestive) heart failure


Current Visit: Yes   Status: Acute   Code(s): I50.23 - ACUTE ON CHRONIC 

SYSTOLIC (CONGESTIVE) HEART FAILURE   SNOMED Code(s): 653638701


   Comment: Now with LE edema and crackles. Continue lasix.    





(5) Afib


Current Visit: Yes   Status: Acute   Code(s): I48.91 - UNSPECIFIED ATRIAL 

FIBRILLATION   SNOMED Code(s): 48320978


   Comment: Pt regular on exam. Continue metoprolol XL.    





(6) CAD (coronary artery disease)


Current Visit: Yes   Status: Acute   Code(s): I25.10 - ATHSCL HEART DISEASE OF 

NATIVE CORONARY ARTERY W/O ANG PCTRS   SNOMED Code(s): 71299747


   Comment: No c/o chest pain. Continue ASA, lipitor and metoprolol.    





(7) Hypertension


Current Visit: Yes   Status: Acute   Code(s): I10 - ESSENTIAL (PRIMARY) 

HYPERTENSION   SNOMED Code(s): 89266877


   Comment: BP low normal. Monitor BP with reintroduction of lasix.    





(8) Connective tissue disorder


Current Visit: Yes   Status: Acute   Code(s): M35.9 - SYSTEMIC INVOLVEMENT OF 

CONNECTIVE TISSUE, UNSPECIFIED   SNOMED Code(s): 849055326


   Comment: Continue plaquenil.    





(9) Dyslipidemia


Current Visit: Yes   Status: Acute   Code(s): E78.5 - HYPERLIPIDEMIA, 

UNSPECIFIED   SNOMED Code(s): 048723884


   Comment: Continue lipitor.    





(10) DVT prophylaxis


Current Visit: Yes   Status: Acute   Code(s): SYS3512 -    SNOMED Code(s): 

401223144


   Comment: SQ heparin

## 2019-12-18 RX ADMIN — HYDROXYCHLOROQUINE SULFATE SCH MG: 200 TABLET, FILM COATED ORAL at 06:33

## 2019-12-18 RX ADMIN — CALCIUM CARBONATE (ANTACID) CHEW TAB 500 MG SCH MG: 500 CHEW TAB at 09:52

## 2019-12-18 RX ADMIN — Medication SCH UNITS: at 09:52

## 2019-12-18 RX ADMIN — HEPARIN SODIUM SCH: 5000 INJECTION INTRAVENOUS; SUBCUTANEOUS at 20:13

## 2019-12-18 RX ADMIN — FUROSEMIDE SCH MG: 20 TABLET ORAL at 09:52

## 2019-12-18 RX ADMIN — HEPARIN SODIUM SCH: 5000 INJECTION INTRAVENOUS; SUBCUTANEOUS at 12:23

## 2019-12-18 RX ADMIN — METOPROLOL SUCCINATE SCH MG: 25 TABLET, EXTENDED RELEASE ORAL at 09:52

## 2019-12-18 RX ADMIN — HEPARIN SODIUM SCH: 5000 INJECTION INTRAVENOUS; SUBCUTANEOUS at 06:32

## 2019-12-18 RX ADMIN — METOPROLOL SUCCINATE SCH MG: 25 TABLET, EXTENDED RELEASE ORAL at 20:21

## 2019-12-18 RX ADMIN — ATORVASTATIN CALCIUM SCH MG: 10 TABLET, FILM COATED ORAL at 09:52

## 2019-12-18 RX ADMIN — PANTOPRAZOLE SODIUM SCH MG: 40 TABLET, DELAYED RELEASE ORAL at 09:52

## 2019-12-18 RX ADMIN — AMOXICILLIN AND CLAVULANATE POTASSIUM SCH MG: 500; 125 TABLET, FILM COATED ORAL at 09:52

## 2019-12-18 RX ADMIN — ASPIRIN SCH MG: 81 TABLET, COATED ORAL at 09:52

## 2019-12-18 RX ADMIN — AMOXICILLIN AND CLAVULANATE POTASSIUM SCH MG: 500; 125 TABLET, FILM COATED ORAL at 20:21

## 2019-12-18 RX ADMIN — CALCIUM CARBONATE (ANTACID) CHEW TAB 500 MG SCH MG: 500 CHEW TAB at 20:21

## 2019-12-18 NOTE — PN
Subjective


Date of Service: 12/18/19


Interval History: 





Mr. Vazquez states that LUE and b/l LE edema is decreasing; he denies pain 

associated with edema.  He denies SOB, but does complain of a mild cough, which 

is not new.  He has no other complaints today.





Family History: Unchanged from Admission


Social History: Unchanged from Admission


Past Medical History: Unchanged from Admission





Objective


Active Medications: 








Amoxicillin/Clavulanate Potassium (Augmentin Tab*)  500 mg PO BID [facility]


   Last Admin: 12/18/19 09:52 Dose:  500 mg


Aspirin (Aspirin Ec Tab*)  81 mg PO DAILY [facility]


   Last Admin: 12/18/19 09:52 Dose:  81 mg


Atorvastatin Calcium (Lipitor*)  10 mg PO DAILY [facility]


   Last Admin: 12/18/19 09:52 Dose:  10 mg


Calcium Carbonate (Tums*)  500 mg PO BID [facility]


   Last Admin: 12/18/19 09:52 Dose:  500 mg


Cholecalciferol (Vitamin D Tab*)  1,000 units PO DAILY [facility]


   Last Admin: 12/18/19 09:52 Dose:  1,000 units


Furosemide (Lasix Tab*)  60 mg PO QAM [facility]


   Last Admin: 12/18/19 09:52 Dose:  60 mg


Heparin Sodium (Porcine) (Heparin Vial(*))  5,000 units SUBCUT Q8HR [facility]


   Last Admin: 12/18/19 12:23 Dose:  Not Given


Hydroxychloroquine Sulfate (Plaquenil Tab*)  200 mg PO DAILY@0600 [facility]


   Last Admin: 12/18/19 06:33 Dose:  200 mg


Metoprolol Succinate (Toprol Xl Tab*)  25 mg PO BID [facility]


   Last Admin: 12/18/19 09:52 Dose:  25 mg


Miscellaneous (Ativan Pyxis Miller)  1 ea N/A .ATIVAN IV KEY PRN


   PRN Reason: PYXIS KEY


Pantoprazole Sodium (Protonix Tab*)  40 mg PO DAILY [facility]


   Last Admin: 12/18/19 09:52 Dose:  40 mg








 


Vital Signs:











Temp Pulse Resp BP Pulse Ox


 


 97.8 F   64   18   112/55   99 


 


 12/18/19 15:15  12/18/19 15:15  12/18/19 15:15  12/18/19 15:15  12/18/19 15:15











Oxygen Devices in Use Now: None


Appearance: Mr. Vazquez is an elderly white male who is sitting in chair with LE 

at floor.  He is breathing comfortably on room air and appears to be in no 

acute distress.  He is pleasant, appropriate, cooperative.


Eyes: No Scleral Icterus, PERRLA


Ears/Nose/Mouth/Throat: NL Teeth, Lips, Gums, Clear Oropharnyx, Mucous 

Membranes Moist


Neck: NL Appearance and Movements; NL JVP, Trachea Midline


Respiratory: Symmetrical Chest Expansion and Respiratory Effort, - - Bibasilar 

crackles


Cardiovascular: - - RRR; systolic murmur; 2+ LUE edema, 1+ b/l LE edema


Abdominal: NL Sounds; No Tenderness; No Distention, - - suprapubic catheter in 

place


Extremities: No Clubbing, Cyanosis


Neurological: Alert and Oriented x 3, NL Muscle Strength and Tone


Result Diagrams: 


 12/19/19 06:01





 12/19/19 06:01


Microbiology and Other Data: 


 Microbiology











 12/13/19 17:11 Urine Culture - Preliminary





 Urine    Enterococcus Faecalis


 


 12/13/19 13:01 Aerobic Blood Culture - Preliminary





 Blood Venous    No Growth Day 1





 Anaerobic Blood Culture - Preliminary





    No Growth Day 1


 


 12/14/19 00:00 Nasal Screen MRSA (PCR) - Final





 Nasal    Mrsa Not Detected














Assess/Plan/Problems-Billing





Mr Vazquez is an 88 yo M who has a h/o chronic systolic CHF, CAD, ILD, mixed 

connective tissue disorder, HTN, CKD stage III and atrial fibrillation who 

presented to the ER after being found unresponsive by his wife and was found to 

be markedly hypoglycemic and in ARF with urinary retention.








- Patient Problems


(1) Urinary tract infection


Comment: 


-3+ LE, + bacteria


-UC + for enterococcus


-initially on Zosyn x3d, then transitioned to Augmentin


-continue augmentin for 4 more days   





(2) Acute renal failure superimposed on stage 3 chronic kidney disease


Comment: 


-creatinine down slightly


-possible component of ATN given slow improvment in renal function


-continue suprapubic catheter for obstructive uropathy


-lasix restarted


-BMP in a.m. to assess creatinine in setting of ARF and lasix restart


-may need nephrology input if creatinine worsens   





(3) Acute on chronic systolic (congestive) heart failure


Comment: 


-b/l LE edema, bibasilar rales


-Lasix 60 PO yesterday, today


-add on 40 IV this afternoon


-I/O, daily weights   





(4) Hypertension


Comment: 


-SBP low normal 100-120's


-continue metoprolol, furosemide


-monitor BP with reintroduction of lasix   





(5) Afib


Comment: 


-RRR on PE 


-continue metoprolol


-not on AC   





(6) CAD (coronary artery disease)


Comment: 


-denies chest pain


-continue ASA, lipitor, metoprolol   





(7) Dyslipidemia


Comment: 


-continue lipitor   





(8) Connective tissue disorder


Comment: 


-continue plaquenil   





(9) DVT prophylaxis


Comment: 


-heparin   





(10) Full code status


Comment: 


   


Status and Disposition: 





Inpatient.  Discharge when stable.

## 2019-12-19 LAB
ALBUMIN SERPL BCG-MCNC: 2.8 G/DL (ref 3.2–5.2)
ALBUMIN/GLOB SERPL: 1 {RATIO} (ref 1–3)
ALP SERPL-CCNC: 173 U/L (ref 34–104)
ALT SERPL W P-5'-P-CCNC: 37 U/L (ref 7–52)
ANION GAP SERPL CALC-SCNC: 6 MMOL/L (ref 2–11)
AST SERPL-CCNC: 45 U/L (ref 13–39)
BASOPHILS # BLD AUTO: 0.1 10^3/UL (ref 0–0.2)
BUN SERPL-MCNC: 43 MG/DL (ref 6–24)
BUN/CREAT SERPL: 14.2 (ref 8–20)
CALCIUM SERPL-MCNC: 8.2 MG/DL (ref 8.6–10.3)
CHLORIDE SERPL-SCNC: 99 MMOL/L (ref 101–111)
EOSINOPHIL # BLD AUTO: 0.5 10^3/UL (ref 0–0.6)
GLOBULIN SER CALC-MCNC: 2.9 G/DL (ref 2–4)
GLUCOSE SERPL-MCNC: 91 MG/DL (ref 70–100)
HCO3 SERPL-SCNC: 28 MMOL/L (ref 22–32)
HCT VFR BLD AUTO: 24 % (ref 42–52)
HGB BLD-MCNC: 8.1 G/DL (ref 14–18)
LYMPHOCYTES # BLD AUTO: 1.4 10^3/UL (ref 1–4.8)
MCH RBC QN AUTO: 33 PG (ref 27–31)
MCHC RBC AUTO-ENTMCNC: 34 G/DL (ref 31–36)
MCV RBC AUTO: 99 FL (ref 80–94)
MONOCYTES # BLD AUTO: 1 10^3/UL (ref 0–0.8)
NEUTROPHILS # BLD AUTO: 6.7 10^3/UL (ref 1.5–7.7)
NRBC # BLD AUTO: 0 10^3/UL
NRBC BLD QL AUTO: 0.2
PLATELET # BLD AUTO: 121 10^3/UL (ref 150–450)
POTASSIUM SERPL-SCNC: 3.5 MMOL/L (ref 3.5–5)
PROT SERPL-MCNC: 5.7 G/DL (ref 6.4–8.9)
RBC # BLD AUTO: 2.43 10^6 /UL (ref 4.18–5.48)
SODIUM SERPL-SCNC: 133 MMOL/L (ref 135–145)
WBC # BLD AUTO: 9.6 10^3/UL (ref 3.5–10.8)

## 2019-12-19 RX ADMIN — Medication SCH UNITS: at 10:41

## 2019-12-19 RX ADMIN — METOPROLOL SUCCINATE SCH MG: 25 TABLET, EXTENDED RELEASE ORAL at 10:41

## 2019-12-19 RX ADMIN — METOPROLOL SUCCINATE SCH MG: 25 TABLET, EXTENDED RELEASE ORAL at 20:40

## 2019-12-19 RX ADMIN — HEPARIN SODIUM SCH UNITS: 5000 INJECTION INTRAVENOUS; SUBCUTANEOUS at 20:41

## 2019-12-19 RX ADMIN — ATORVASTATIN CALCIUM SCH MG: 10 TABLET, FILM COATED ORAL at 10:41

## 2019-12-19 RX ADMIN — HEPARIN SODIUM SCH: 5000 INJECTION INTRAVENOUS; SUBCUTANEOUS at 05:30

## 2019-12-19 RX ADMIN — PANTOPRAZOLE SODIUM SCH MG: 40 TABLET, DELAYED RELEASE ORAL at 10:41

## 2019-12-19 RX ADMIN — AMOXICILLIN AND CLAVULANATE POTASSIUM SCH MG: 500; 125 TABLET, FILM COATED ORAL at 20:40

## 2019-12-19 RX ADMIN — CALCIUM CARBONATE (ANTACID) CHEW TAB 500 MG SCH MG: 500 CHEW TAB at 10:39

## 2019-12-19 RX ADMIN — FUROSEMIDE SCH MG: 20 TABLET ORAL at 10:40

## 2019-12-19 RX ADMIN — HEPARIN SODIUM SCH UNITS: 5000 INJECTION INTRAVENOUS; SUBCUTANEOUS at 15:41

## 2019-12-19 RX ADMIN — ASPIRIN SCH MG: 81 TABLET, COATED ORAL at 10:41

## 2019-12-19 RX ADMIN — HYDROXYCHLOROQUINE SULFATE SCH MG: 200 TABLET, FILM COATED ORAL at 05:35

## 2019-12-19 RX ADMIN — CALCIUM CARBONATE (ANTACID) CHEW TAB 500 MG SCH MG: 500 CHEW TAB at 20:40

## 2019-12-19 RX ADMIN — AMOXICILLIN AND CLAVULANATE POTASSIUM SCH MG: 500; 125 TABLET, FILM COATED ORAL at 10:41

## 2019-12-19 NOTE — PN
Subjective


Date of Service: 12/19/19


Interval History: 





Mr. Vazquez states he is doing well today.  He c/o mild tenderness to LUE, denies 

LE tenderness.  He states that he has to change his catheter bag once at night, 

which he feels is a nuisance.  Discussed possibly going to [facility], but patient is 

not interested and feels he can manage his castellano bag on his own.  No other 

complaints today.





Family History: Unchanged from Admission


Social History: Unchanged from Admission


Past Medical History: Unchanged from Admission





Objective


Active Medications: 








Amoxicillin/Clavulanate Potassium (Augmentin Tab*)  500 mg PO BID [facility]


   Last Admin: 12/19/19 10:41 Dose:  500 mg


Aspirin (Aspirin Ec Tab*)  81 mg PO DAILY [facility]


   Last Admin: 12/19/19 10:41 Dose:  81 mg


Atorvastatin Calcium (Lipitor*)  10 mg PO DAILY [facility]


   Last Admin: 12/19/19 10:41 Dose:  10 mg


Calcium Carbonate (Tums*)  500 mg PO BID [facility]


   Last Admin: 12/19/19 10:39 Dose:  500 mg


Cholecalciferol (Vitamin D Tab*)  1,000 units PO DAILY [facility]


   Last Admin: 12/19/19 10:41 Dose:  1,000 units


Furosemide (Lasix Tab*)  60 mg PO QAM [facility]


   Last Admin: 12/19/19 10:40 Dose:  60 mg


Heparin Sodium (Porcine) (Heparin Vial(*))  5,000 units SUBCUT Q8HR [facility]


   Last Admin: 12/19/19 15:41 Dose:  5,000 units


Hydroxychloroquine Sulfate (Plaquenil Tab*)  200 mg PO DAILY@0600 [facility]


   Last Admin: 12/19/19 05:35 Dose:  200 mg


Metoprolol Succinate (Toprol Xl Tab*)  25 mg PO BID [facility]


   Last Admin: 12/19/19 10:41 Dose:  25 mg


Pantoprazole Sodium (Protonix Tab*)  40 mg PO DAILY [facility]


   Last Admin: 12/19/19 10:41 Dose:  40 mg








Vital Signs:











Temp Pulse Resp BP Pulse Ox


 


 97.7 F   71   18   111/45   97 


 


 12/19/19 11:10  12/19/19 11:10  12/19/19 11:10  12/19/19 11:10  12/19/19 11:10











Oxygen Devices in Use Now: None


Appearance: Mr. Vazquez is an elderly white male who is sitting in his chair with 

LE at floor.  He is breathing comfortably and appears to be in no acute 

distress.


Eyes: No Scleral Icterus, PERRLA


Ears/Nose/Mouth/Throat: NL Teeth, Lips, Gums, Clear Oropharnyx, Mucous 

Membranes Moist


Neck: NL Appearance and Movements; NL JVP, Trachea Midline


Respiratory: Symmetrical Chest Expansion and Respiratory Effort, - - Bibasilar 

rales


Cardiovascular: NL Sounds; No Murmurs; No JVD, RRR


Abdominal: NL Sounds; No Tenderness; No Distention, No Hepatosplenomegaly, - - 

Suprapubic catheter in place with clear yellow drainage in bag; no CVA 

tenderness


Extremities: No Edema, No Clubbing, Cyanosis


Skin: - - LUE 1+ pitting edema; b/l LE 1+ pitting edema


Neurological: Alert and Oriented x 3


Result Diagrams: 


 12/19/19 06:01





 12/19/19 06:01


Microbiology and Other Data: 


 Microbiology











 12/13/19 17:11 Urine Culture - Preliminary





 Urine    Enterococcus Faecalis


 


 12/13/19 13:01 Aerobic Blood Culture - Preliminary





 Blood Venous    No Growth Day 1





 Anaerobic Blood Culture - Preliminary





    No Growth Day 1


 


 12/14/19 00:00 Nasal Screen MRSA (PCR) - Final





 Nasal    Mrsa Not Detected














Assess/Plan/Problems-Billing





Mr Vazquez is an 88 yo M who has a h/o chronic systolic CHF, CAD, ILD, mixed 

connective tissue disorder, HTN, CKD stage III and atrial fibrillation who 

presented to the ER after being found unresponsive by his wife and was found to 

be markedly hypoglycemic and in ARF with urinary retention.








- Patient Problems


(1) Urinary tract infection


Comment: 


-3+ LE, + bacteria


-UC + for enterococcus


-initially on Zosyn x3d, then transitioned to Augmentin


-continue augmentin for 3 more days for a total of 10 days treatment   





(2) Acute renal failure superimposed on stage 3 chronic kidney disease


Comment: 


-creatinine trending down


-possible component of ATN given slow improvment in renal function


-continue suprapubic catheter for obstructive uropathy


-lasix restarted


-continue to monitor BMP


-will require outpatient follow-up with Dr. Sierra within 1 week of discharge   





(3) Acute on chronic systolic (congestive) heart failure


Comment: 


-b/l LE edema, bibasilar rales


-continue Lasix 60 PO


-I/O, daily weights   





(4) Left upper extremity swelling


Comment: 


-LUE edema has decreased


-patient and wife state that this occurs relatively frequently


-US negative for DVT


-continue Lasix   





(5) Hypertension


Comment: 


-SBP low normal 100-110's


-continue metoprolol, furosemide   





(6) Afib


Comment: 


-RRR on PE 


-continue metoprolol


-not on AC   





(7) CAD (coronary artery disease)


Comment: 


-denies chest pain


-continue ASA, lipitor, metoprolol   





(8) Dyslipidemia


Comment: 


-continue lipitor   





(9) Connective tissue disorder


Comment: 


-continue plaquenil   





(10) DVT prophylaxis


Comment: 


-heparin   





(11) Full code status


Comment: 


   


Status and Disposition: 





Inpatient.  Discharge when stable.

## 2019-12-20 LAB
ANION GAP SERPL CALC-SCNC: 7 MMOL/L (ref 2–11)
BASOPHILS # BLD AUTO: 0 10^3/UL (ref 0–0.2)
BUN SERPL-MCNC: 41 MG/DL (ref 6–24)
BUN/CREAT SERPL: 15.4 (ref 8–20)
CALCIUM SERPL-MCNC: 8.1 MG/DL (ref 8.6–10.3)
CHLORIDE SERPL-SCNC: 99 MMOL/L (ref 101–111)
EOSINOPHIL # BLD AUTO: 0.5 10^3/UL (ref 0–0.6)
FERRITIN SERPL IA-MCNC: 602.7 NG/ML (ref 24–336)
FOLATE SERPL-MCNC: 5.45 NG/ML (ref 3.99–?)
GLUCOSE SERPL-MCNC: 88 MG/DL (ref 70–100)
HCO3 SERPL-SCNC: 27 MMOL/L (ref 22–32)
HCT VFR BLD AUTO: 24 % (ref 42–52)
HGB BLD-MCNC: 8 G/DL (ref 14–18)
IRON SERPL-MCNC: 36 UG/DL (ref 50–212)
LYMPHOCYTES # BLD AUTO: 1.2 10^3/UL (ref 1–4.8)
MCH RBC QN AUTO: 33 PG (ref 27–31)
MCHC RBC AUTO-ENTMCNC: 34 G/DL (ref 31–36)
MCV RBC AUTO: 99 FL (ref 80–94)
MONOCYTES # BLD AUTO: 1 10^3/UL (ref 0–0.8)
NEUTROPHILS # BLD AUTO: 6.5 10^3/UL (ref 1.5–7.7)
NRBC # BLD AUTO: 0 10^3/UL
NRBC BLD QL AUTO: 0
PLATELET # BLD AUTO: 125 10^3/UL (ref 150–450)
POTASSIUM SERPL-SCNC: 3.5 MMOL/L (ref 3.5–5)
RBC # BLD AUTO: 2.4 10^6 /UL (ref 4.18–5.48)
SODIUM SERPL-SCNC: 133 MMOL/L (ref 135–145)
TIBC SERPL-MCNC: 188 MCG/DL (ref 250–450)
TRANSFERRIN SERPL-MCNC: 134 MG/DL (ref 203–362)
WBC # BLD AUTO: 9.2 10^3/UL (ref 3.5–10.8)

## 2019-12-20 RX ADMIN — CYANOCOBALAMIN TAB 500 MCG SCH MCG: 500 TAB at 17:15

## 2019-12-20 RX ADMIN — AMOXICILLIN AND CLAVULANATE POTASSIUM SCH MG: 500; 125 TABLET, FILM COATED ORAL at 09:16

## 2019-12-20 RX ADMIN — METOPROLOL SUCCINATE SCH MG: 25 TABLET, EXTENDED RELEASE ORAL at 09:16

## 2019-12-20 RX ADMIN — FOLIC ACID SCH MG: 1 TABLET ORAL at 17:15

## 2019-12-20 RX ADMIN — CALCIUM CARBONATE (ANTACID) CHEW TAB 500 MG SCH MG: 500 CHEW TAB at 09:17

## 2019-12-20 RX ADMIN — CALCIUM CARBONATE (ANTACID) CHEW TAB 500 MG SCH MG: 500 CHEW TAB at 20:52

## 2019-12-20 RX ADMIN — HEPARIN SODIUM SCH UNITS: 5000 INJECTION INTRAVENOUS; SUBCUTANEOUS at 21:16

## 2019-12-20 RX ADMIN — ASPIRIN SCH MG: 81 TABLET, COATED ORAL at 09:17

## 2019-12-20 RX ADMIN — THERA TABS SCH TAB: TAB at 17:15

## 2019-12-20 RX ADMIN — HEPARIN SODIUM SCH UNITS: 5000 INJECTION INTRAVENOUS; SUBCUTANEOUS at 05:07

## 2019-12-20 RX ADMIN — AMOXICILLIN AND CLAVULANATE POTASSIUM SCH MG: 500; 125 TABLET, FILM COATED ORAL at 20:52

## 2019-12-20 RX ADMIN — HEPARIN SODIUM SCH UNITS: 5000 INJECTION INTRAVENOUS; SUBCUTANEOUS at 13:48

## 2019-12-20 RX ADMIN — ATORVASTATIN CALCIUM SCH MG: 10 TABLET, FILM COATED ORAL at 09:16

## 2019-12-20 RX ADMIN — METOPROLOL SUCCINATE SCH MG: 25 TABLET, EXTENDED RELEASE ORAL at 20:52

## 2019-12-20 RX ADMIN — FUROSEMIDE SCH MG: 20 TABLET ORAL at 09:17

## 2019-12-20 RX ADMIN — HYDROXYCHLOROQUINE SULFATE SCH MG: 200 TABLET, FILM COATED ORAL at 05:06

## 2019-12-20 RX ADMIN — Medication SCH UNITS: at 09:16

## 2019-12-20 RX ADMIN — PANTOPRAZOLE SODIUM SCH MG: 40 TABLET, DELAYED RELEASE ORAL at 09:16

## 2019-12-20 NOTE — PN
Subjective


Date of Service: 12/20/19


Interval History: 





Mr. Vazquez is feeling well today.  He states that he has L arm pain in the area 

of the anterior deltoid that is tender to palpation, but does not hinder 

movement or worsen with movement.  He had a BM yesterday.  He has good urinary 

output.  He has no other complaints today.





Family History: Unchanged from Admission


Social History: Unchanged from Admission


Past Medical History: Unchanged from Admission





Objective


Active Medications: 








Amoxicillin/Clavulanate Potassium (Augmentin Tab*)  500 mg PO BID Count includes the [facility]


   Last Admin: 12/20/19 09:16 Dose:  500 mg


Aspirin (Aspirin Ec Tab*)  81 mg PO DAILY Count includes the [facility]


   Last Admin: 12/20/19 09:17 Dose:  81 mg


Atorvastatin Calcium (Lipitor*)  10 mg PO DAILY Count includes the [facility]


   Last Admin: 12/20/19 09:16 Dose:  10 mg


Calcium Carbonate (Tums*)  500 mg PO BID Count includes the [facility]


   Last Admin: 12/20/19 09:17 Dose:  500 mg


Cholecalciferol (Vitamin D Tab*)  1,000 units PO DAILY Count includes the [facility]


   Last Admin: 12/20/19 09:16 Dose:  1,000 units


Furosemide (Lasix Tab*)  60 mg PO QAM Count includes the [facility]


   Last Admin: 12/20/19 09:17 Dose:  60 mg


Heparin Sodium (Porcine) (Heparin Vial(*))  5,000 units SUBCUT Q8HR Count includes the [facility]


   Last Admin: 12/20/19 05:07 Dose:  5,000 units


Hydroxychloroquine Sulfate (Plaquenil Tab*)  200 mg PO DAILY@0600 Count includes the [facility]


   Last Admin: 12/20/19 05:06 Dose:  200 mg


Metoprolol Succinate (Toprol Xl Tab*)  25 mg PO BID Count includes the [facility]


   Last Admin: 12/20/19 09:16 Dose:  25 mg


Pantoprazole Sodium (Protonix Tab*)  40 mg PO DAILY Count includes the [facility]


   Last Admin: 12/20/19 09:16 Dose:  40 mg








Vital Signs:











Temp Pulse Resp BP Pulse Ox


 


 97.6 F   65   15   108/42   100 


 


 12/20/19 15:15  12/20/19 15:15  12/20/19 15:15  12/20/19 15:15  12/20/19 15:15











Oxygen Devices in Use Now: None


Appearance: Mr. Vazquez is an elderly white male who is sitting in chair with LE 

at ground.  He appears comfortable and in no acute distress.


Eyes: No Scleral Icterus, PERRLA


Ears/Nose/Mouth/Throat: NL Teeth, Lips, Gums, Clear Oropharnyx, Mucous 

Membranes Moist


Neck: NL Appearance and Movements; NL JVP, Trachea Midline


Respiratory: Symmetrical Chest Expansion and Respiratory Effort, Clear to 

Auscultation


Cardiovascular: NL Sounds; No Murmurs; No JVD, RRR, - - 1+ UE pitting edema; 1+ 

LE pitting edema


Abdominal: NL Sounds; No Tenderness; No Distention, No Hepatosplenomegaly


Extremities: No Clubbing, Cyanosis, - - full AROM; L anterior deltoid area TTP 

at site of IV insertion and subsequent removal with mild ecchymosis surrounding 

area


Neurological: Alert and Oriented x 3, NL Muscle Strength and Tone


Result Diagrams: 


 12/20/19 06:29





 12/20/19 06:29


Microbiology and Other Data: 


 Microbiology











 12/13/19 17:11 Urine Culture - Preliminary





 Urine    Enterococcus Faecalis


 


 12/13/19 13:01 Aerobic Blood Culture - Preliminary





 Blood Venous    No Growth Day 1





 Anaerobic Blood Culture - Preliminary





    No Growth Day 1


 


 12/14/19 00:00 Nasal Screen MRSA (PCR) - Final





 Nasal    Mrsa Not Detected














Assess/Plan/Problems-Billing





Mr Vazquez is an 86 yo M who has a h/o chronic systolic CHF, CAD, ILD, mixed 

connective tissue disorder, HTN, CKD stage III and atrial fibrillation who 

presented to the ER after being found unresponsive by his wife and was found to 

be markedly hypoglycemic and in ARF with urinary retention.








- Patient Problems


(1) Urinary tract infection


Comment: 


-3+ LE, + bacteria


-UC + for enterococcus


-initially on Zosyn x3d, then transitioned to Augmentin


-continue augmentin for 2 more days for a total of 10 days treatment   





(2) Acute renal failure superimposed on stage 3 chronic kidney disease


Comment: 


-creatinine greatly improving, close to baseline


-possible component of ATN given slow improvment in renal function


-continue suprapubic catheter for obstructive uropathy


-lasix restarted


-continue to monitor BMP


-will require outpatient follow-up with Dr. Sierra within 1 week of discharge   





(3) Acute on chronic systolic (congestive) heart failure


Comment: 


-b/l LE edema, bibasilar rales


-continue Lasix 60 PO


-will forego extra IV lasix doses at this time due to low-normal SBP


-I/O, daily weights


-recommend LE elevation   





(4) Left upper extremity swelling


Comment: 


-LUE edema has decreased


-patient and wife state that L UE edema occurs relatively frequently in 

hospital setting


-US negative for DVT


-continue Lasix   





(5) Anemia


Comment: 


-H&H at baseline


-iron studies, B12, folate ordered


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


-will add on B12, folate supplementation   





(6) Hypertension


Comment: 


-SBP low normal 100-110's


-continue metoprolol, furosemide   





(7) Afib


Comment: 


-RRR on PE 


-continue metoprolol


-not on AC   





(8) CAD (coronary artery disease)


Comment: 


-denies chest pain


-continue ASA, lipitor, metoprolol   





(9) Dyslipidemia


Comment: 


-continue lipitor   





(10) Connective tissue disorder


Comment: 


-continue plaquenil   





(11) DVT prophylaxis


Comment: 


-heparin   





(12) Full code status


Comment: 


   


Status and Disposition: 





Inpatient.  Discharge when stable.

## 2019-12-21 VITALS — SYSTOLIC BLOOD PRESSURE: 108 MMHG | DIASTOLIC BLOOD PRESSURE: 50 MMHG

## 2019-12-21 LAB
ANION GAP SERPL CALC-SCNC: 6 MMOL/L (ref 2–11)
BASOPHILS # BLD AUTO: 0 10^3/UL (ref 0–0.2)
BUN SERPL-MCNC: 40 MG/DL (ref 6–24)
BUN/CREAT SERPL: 15.7 (ref 8–20)
CALCIUM SERPL-MCNC: 8.1 MG/DL (ref 8.6–10.3)
CHLORIDE SERPL-SCNC: 98 MMOL/L (ref 101–111)
EOSINOPHIL # BLD AUTO: 0.5 10^3/UL (ref 0–0.6)
GLUCOSE SERPL-MCNC: 88 MG/DL (ref 70–100)
HCO3 SERPL-SCNC: 28 MMOL/L (ref 22–32)
HCT VFR BLD AUTO: 23 % (ref 42–52)
HGB BLD-MCNC: 7.9 G/DL (ref 14–18)
LYMPHOCYTES # BLD AUTO: 1.4 10^3/UL (ref 1–4.8)
MCH RBC QN AUTO: 34 PG (ref 27–31)
MCHC RBC AUTO-ENTMCNC: 34 G/DL (ref 31–36)
MCV RBC AUTO: 100 FL (ref 80–94)
MONOCYTES # BLD AUTO: 1.2 10^3/UL (ref 0–0.8)
NEUTROPHILS # BLD AUTO: 6.5 10^3/UL (ref 1.5–7.7)
NRBC # BLD AUTO: 0 10^3/UL
NRBC BLD QL AUTO: 0
PLATELET # BLD AUTO: 146 10^3/UL (ref 150–450)
POTASSIUM SERPL-SCNC: 3.6 MMOL/L (ref 3.5–5)
RBC # BLD AUTO: 2.33 10^6 /UL (ref 4.18–5.48)
SODIUM SERPL-SCNC: 132 MMOL/L (ref 135–145)
WBC # BLD AUTO: 9.6 10^3/UL (ref 3.5–10.8)

## 2019-12-21 RX ADMIN — METOPROLOL SUCCINATE SCH MG: 25 TABLET, EXTENDED RELEASE ORAL at 08:47

## 2019-12-21 RX ADMIN — FOLIC ACID SCH MG: 1 TABLET ORAL at 08:46

## 2019-12-21 RX ADMIN — HYDROXYCHLOROQUINE SULFATE SCH MG: 200 TABLET, FILM COATED ORAL at 06:09

## 2019-12-21 RX ADMIN — THERA TABS SCH TAB: TAB at 08:46

## 2019-12-21 RX ADMIN — PANTOPRAZOLE SODIUM SCH MG: 40 TABLET, DELAYED RELEASE ORAL at 08:46

## 2019-12-21 RX ADMIN — Medication SCH UNITS: at 08:46

## 2019-12-21 RX ADMIN — ATORVASTATIN CALCIUM SCH MG: 10 TABLET, FILM COATED ORAL at 08:46

## 2019-12-21 RX ADMIN — FUROSEMIDE SCH MG: 20 TABLET ORAL at 08:46

## 2019-12-21 RX ADMIN — CALCIUM CARBONATE (ANTACID) CHEW TAB 500 MG SCH MG: 500 CHEW TAB at 08:46

## 2019-12-21 RX ADMIN — HEPARIN SODIUM SCH UNITS: 5000 INJECTION INTRAVENOUS; SUBCUTANEOUS at 06:08

## 2019-12-21 RX ADMIN — CYANOCOBALAMIN TAB 500 MCG SCH MCG: 500 TAB at 08:46

## 2019-12-21 RX ADMIN — AMOXICILLIN AND CLAVULANATE POTASSIUM SCH MG: 500; 125 TABLET, FILM COATED ORAL at 08:46

## 2019-12-21 RX ADMIN — ASPIRIN SCH MG: 81 TABLET, COATED ORAL at 08:46

## 2019-12-22 NOTE — DS
CC:  Dr. Cholo Boo; Dr. Sarath Genao*

 

DISCHARGE SUMMARY:

 

DATE OF ADMISSION:  12/13/19

 

DATE OF DISCHARGE:  12/21/19

 

PRIMARY CARE PROVIDER:  Dr. Cholo Boo.

 

ATTENDING PHYSICIAN:  Dr. Sarath Genao* (dictated by [facility]
)

 

PRIMARY DIAGNOSES:

1.  Unresponsive episode due to hypoglycemia.

2.  Acute renal failure superimposed on chronic kidney disease.

3.  Urinary retention due to obstructive uropathy, now with suprapubic catheter 
in place.

4.  Electrolyte abnormalities:  Hyperkalemia, hyperphosphatemia, hypomagnesemia.

5.  Acute on chronic systolic heart failure exacerbation.

6.  Macrocytic anemia superimposed on anemia of chronic disease.

7.  Left upper extremity edema, pain.

 

SECONDARY DIAGNOSES:

1.  Heart failure, reduced ejection fraction.

2.  Coronary artery disease.

3.  Interstitial lung disease.

4.  Hypertension.

5.  Hyperlipidemia.

6.  Connective tissue disorder.

7.  Chronic kidney disease, stage 3.

8.  Atrial fibrillation, not on anticoagulation.

 

STUDIES WHILE IN THE HOSPITAL:

1.  Chest x-ray, impression:  Mild pulmonary vascular congestion and 
interstitial edema.

2.  CT brain, impression:  No acute intracranial abnormality by CT, mild 
chronic small vessel ischemic disease.

3.  CT abdomen and pelvis, impression:  Cholelithiasis, no hydronephrosis or 
nephrolithiasis, fat containing right inguinal hernia, atherosclerosis, small 
hiatal hernia.

4.  Suprapubic catheter insertion ultrasound, impression:  Uncomplicated 
sonographically-guided placement of a 12-gauge Mal[place]ot catheter in the lumen of 
the urinary bladder as described in the report, approximately 400 mL of yellow 
urine with fibrinous sediment was drained.

5.  Chest x-ray, impression:  Chest x-ray findings are most consistent with a 
mild degree of cardiogenic pulmonary edema, likely with small bibasilar pleural 
effusions.

6.  CT brain without, impression:  No acute intracranial pathology, mild 
chronic small vessel ischemic change.

7.  Left upper extremity venous Doppler, impression:  No evidence for left 
upper extremity deep vein thrombosis.

8.  Left humerus fracture, impression:  Osteopenia.  No acute osseous injury.  
If symptoms persist, recommend repeat imaging.

 

DISCHARGE MEDICATIONS:  

Home medications:

1.  Acetaminophen 500 mg p.o. b.i.d. p.r.n.

2.  Aspirin 81 mg p.o. daily.

3.  Atorvastatin 10 mg p.o. daily.

4.  Calcium carbonate two 500 mg p.o. b.i.d.

5.  Cholecalciferol 1000 units p.o. daily.

6.  Furosemide 60 mg p.o. daily.

7.  Hydroxychloroquine 200 mg p.o. daily.

8.  Metoprolol succinate 25 mg p.o. b.i.d.

9.  Omeprazole 20 mg p.o. daily.

10.  Oxycodone/acetaminophen 5/325 one tab p.o. q.6 hours p.r.n.

11.  Psyllium pack, 1 packet p.o. daily.

 

New home medications:

1.  Amoxicillin/clavulanate 500 mg p.o. b.i.d.

2.  Cyanocobalamin 500 mcg p.o. daily.

3.  Folic acid 1 mg p.o. daily.

4.  Multivitamin 1 tab p.o. daily.

 

HISTORY OF PRESENT ILLNESS/HOSPITAL COURSE:  Mr. Vazquez is an 87-year-old male 
with a past medical history of diastolic heart failure; coronary artery disease
; interstitial lung disease; hypertension; hyperlipidemia; chronic kidney 
disease, not on dialysis; atrial fibrillation, not on anticoagulation, who 
presented to the ER on 12/13/19 via ambulance after being found unresponsive 
and hypoglycemic.  He received D50 and slowly became responsive.  Throughout 
his stay, his blood sugar was monitored and remained within normal limits.  He 
was not on any agents that would cause hypoglycemia, but again his blood sugar 
remained within range throughout his stay.

 

When the patient arrived, he was noted to be in acute renal failure with a 
creatinine of 3.98.  He also had electrolyte abnormalities including 
hyperphosphatemia of 6.7 as well as hypomagnesemia.  The patient is on home 
potassium due to Lasix, he takes 20 b.i.d.; likely his hyperkalemia was related 
to his acute renal failure.  Renal failure was likely due to obstructive 
uropathy. Due to enlarged prostate, a suprapubic catheter was placed on 12/13/
19.  Since that time, the patient's creatinine improved daily.  At discharge, 
his creatinine is 2.55, which is very close to his baseline of around 2 to 
2.25.  He will follow with Dr. Kaur on Monday regarding further management 
of his suprapubic catheter.  He states that he is able to manage the catheter 
on his own without difficulty.  In regards to the patient's electrolyte 
abnormalities, his potassium slowly resolved to within normal limits.  He did 
not have any potassium supplementation throughout his stay and still remained 
at an appropriate level even with restarting diuretics approximately 3 to 4 
days prior to discharge.  His phosphorous and magnesium were corrected and are 
within normal limits.

 

The patient's home Lasix was discontinued while he was in the hospital due to 
his poor renal function.  This was eventually restarted when the patient 
started to experience lower extremity edema, left upper extremity edema, 
bibasilar rales on pulmonary exam.  He was given a mixture of IV and oral Lasix 
with good result.  At the time of discharge, he reports a significant reduction 
in his lower extremity edema and he denies shortness of breath.  
Recommendations are to discharge the patient on his home dose of Lasix with no 
potassium supplementation and instructions to have a followup BMP within the 
next 5 days that will be resulted to his primary care provider.  He will then 
follow up with his primary care provider. In regards to the patient's left 
upper extremity edema, he notes that he gets this frequently when he is in the 
hospital and a Doppler ultrasound of the left upper extremity was performed and 
revealed no thrombus.  The patient reports pain at the anterior deltoid area at 
the site of previous IV placement.  This is mildly tender to palpation and does 
not change with arm movement and x-ray was obtained and revealed no acute 
osseous injury.  It is likely that this might be a mild phlebitis or just a 
painful ecchymotic area as ecchymosis is noted in the area of the pain. The 
patient has a macrocytic anemia on lab workup.  Iron studies, B12, and folate 
were ordered and were consistent with anemia of chronic disease.  The patient 
also had a B12 and folate that were on the lower end of normal; it is likely 
that B12 and folate deficiency are superimposed on anemia of chronic disease, 
therefore he has been sent home on supplementation of B12, folate, and 
multivitamin.  At the time of discharge, the patient reports improvement in the 
left upper extremity pain.  He notes he had a bowel movement yesterday.  He 
denies shortness of breath, cough, fever, chills, belly pain, nausea, vomiting, 
diarrhea, myalgias, arthralgias.  He reports decreased edema to the left upper 
extremity.  Mr. Vazquez is stable for discharge home.

 

PHYSICAL EXAMINATION:  Vital Signs:  Temperature 97.9, temporal; heart rate 72; 
respiratory rate 18; oxygen saturation 98% on room air; blood pressure 108/50. 
General:  Mr. Vazquez is a well-developed, well-nourished, average weight, elderly 
white male, who is sitting in chair with lower extremities at the floor.  He is 
breathing comfortably on room air and appears to be in no acute distress.  HEENT
: PERRL, EOMI.  Sclerae are nonicteric.  Hearing is grossly intact.  Dentition 
is poor.  Oral mucous membranes are moist.  There are no lesions.  The pharynx 
is clear without exudate or erythema.  The tongue is at midline and palate 
elevates symmetrically.  Cardiovascular:  Regular rate and rhythm with S1, S2 
present. Positive systolic murmur without rubs, clicks, gallops.  No JVD.  
There is trace left upper extremity, trace bilateral lower extremity pretibial 
edema.  Pulmonary: Symmetrical chest expansion without use of accessory 
muscles.  Fine faint bibasilar rales.  Abdomen:  Bowel sounds in all quadrants, 
soft, nontender to palpation. Suprapubic catheter in place with clear yellow 
drainage in catheter bag, nontender to palpation.  Musculoskeletal:  Full range 
of motion without pain or deformities. Neuro:  The patient is awake.  He is 
alert and oriented x3.  Cranial nerves II through XII grossly intact.  He is 
able to move all of his extremities.  His motor strength is 5/5 bilaterally in 
upper and lower extremities.

 

DISCHARGE PLAN:  Mr. Vazquez will be discharged to home.

 

CONDITION:  Good.

 

DIET:  Heart healthy.

 

ACTIVITY:  As tolerated.

 

MEDICATIONS:  As above.

 

EDUCATION:

1.  Repeat BMP in 4 to 7 days and prior to primary care provider appointment.

2.  Follow up with primary care provider in 4 to 7 days, discuss recent 
hospitalization.

3.  Follow up with Urology, Dr. Sierra, on Monday, 12/23/19 at 0900.

4.  Follow up with [facility] as needed.

5.  Return to the ER or nearest hospital if you experience any return or 
worsening of symptoms, chest pain or discomfort, shortness of breath, 
lightheadedness, loss of consciousness, high fevers, chills, night sweats, or 
any other worrisome signs or symptoms.  Return for decrease in urinary output, 
drainage, or discharge from catheter site, erythema surrounding site.

 

This is a summarized report of a complex medical history and hospital stay.  
For further details, please see the entire medical record.

 

TIME SPENT:  Approximately 35 minutes was spent on this discharge, greater than 
half that time was spent face-to-face to with the patient discussing discharge 
plans and instructions.

 

____________________________________ 

[facility]

 

[id number]/CPS #: [id number]

MARIO

## 2019-12-30 ENCOUNTER — HOSPITAL ENCOUNTER (INPATIENT)
Dept: HOSPITAL 25 - ED | Age: 84
LOS: 3 days | Discharge: HOME HEALTH | DRG: 280 | End: 2020-01-02
Attending: INTERNAL MEDICINE | Admitting: HOSPITALIST
Payer: MEDICARE

## 2019-12-30 DIAGNOSIS — Z88.8: ICD-10-CM

## 2019-12-30 DIAGNOSIS — N18.9: ICD-10-CM

## 2019-12-30 DIAGNOSIS — Z99.3: ICD-10-CM

## 2019-12-30 DIAGNOSIS — I71.4: ICD-10-CM

## 2019-12-30 DIAGNOSIS — N18.3: ICD-10-CM

## 2019-12-30 DIAGNOSIS — E87.2: ICD-10-CM

## 2019-12-30 DIAGNOSIS — Z95.0: ICD-10-CM

## 2019-12-30 DIAGNOSIS — E78.00: ICD-10-CM

## 2019-12-30 DIAGNOSIS — Z66: ICD-10-CM

## 2019-12-30 DIAGNOSIS — Z79.82: ICD-10-CM

## 2019-12-30 DIAGNOSIS — I50.23: ICD-10-CM

## 2019-12-30 DIAGNOSIS — E78.5: ICD-10-CM

## 2019-12-30 DIAGNOSIS — Z79.899: ICD-10-CM

## 2019-12-30 DIAGNOSIS — M19.90: ICD-10-CM

## 2019-12-30 DIAGNOSIS — I25.10: ICD-10-CM

## 2019-12-30 DIAGNOSIS — I13.0: ICD-10-CM

## 2019-12-30 DIAGNOSIS — Z95.1: ICD-10-CM

## 2019-12-30 DIAGNOSIS — K21.9: ICD-10-CM

## 2019-12-30 DIAGNOSIS — I48.0: ICD-10-CM

## 2019-12-30 DIAGNOSIS — I21.4: Primary | ICD-10-CM

## 2019-12-30 DIAGNOSIS — M48.00: ICD-10-CM

## 2019-12-30 DIAGNOSIS — I49.5: ICD-10-CM

## 2019-12-30 DIAGNOSIS — Q23.1: ICD-10-CM

## 2019-12-30 DIAGNOSIS — N13.9: ICD-10-CM

## 2019-12-30 DIAGNOSIS — J84.9: ICD-10-CM

## 2019-12-30 DIAGNOSIS — E87.5: ICD-10-CM

## 2019-12-30 DIAGNOSIS — N17.9: ICD-10-CM

## 2019-12-30 DIAGNOSIS — I25.2: ICD-10-CM

## 2019-12-30 LAB
ALBUMIN SERPL BCG-MCNC: 3.5 G/DL (ref 3.2–5.2)
ALBUMIN/GLOB SERPL: 0.9 {RATIO} (ref 1–3)
ALP SERPL-CCNC: 336 U/L (ref 34–104)
ALT SERPL W P-5'-P-CCNC: 51 U/L (ref 7–52)
ANION GAP SERPL CALC-SCNC: 11 MMOL/L (ref 2–11)
AST SERPL-CCNC: 44 U/L (ref 13–39)
BASOPHILS # BLD AUTO: 0.1 10^3/UL (ref 0–0.2)
BUN SERPL-MCNC: 59 MG/DL (ref 6–24)
BUN/CREAT SERPL: 15.9 (ref 8–20)
CALCIUM SERPL-MCNC: 8.6 MG/DL (ref 8.6–10.3)
CHLORIDE SERPL-SCNC: 103 MMOL/L (ref 101–111)
CHLORIDE UR-SCNC: 83 MMOL/L
CREAT UR-MCNC: 47.01 MG/DL
EOSINOPHIL # BLD AUTO: 0 10^3/UL (ref 0–0.6)
GLOBULIN SER CALC-MCNC: 3.7 G/DL (ref 2–4)
GLUCOSE SERPL-MCNC: 126 MG/DL (ref 70–100)
HCO3 SERPL-SCNC: 17 MMOL/L (ref 22–32)
HCT VFR BLD AUTO: 24 % (ref 42–52)
HGB BLD-MCNC: 7.9 G/DL (ref 14–18)
INR PPP/BLD: 1.22 (ref 0.82–1.09)
LYMPHOCYTES # BLD AUTO: 0.4 10^3/UL (ref 1–4.8)
MAGNESIUM SERPL-MCNC: 1.5 MG/DL (ref 1.9–2.7)
MCH RBC QN AUTO: 33 PG (ref 27–31)
MCHC RBC AUTO-ENTMCNC: 33 G/DL (ref 31–36)
MCV RBC AUTO: 101 FL (ref 80–94)
MONOCYTES # BLD AUTO: 0.9 10^3/UL (ref 0–0.8)
NEUTROPHILS # BLD AUTO: 7.5 10^3/UL (ref 1.5–7.7)
NRBC # BLD AUTO: 0 10^3/UL
NRBC BLD QL AUTO: 0.1
PLATELET # BLD AUTO: 266 10^3/UL (ref 150–450)
POTASSIUM SERPL-SCNC: 5.9 MMOL/L (ref 3.5–5)
POTASSIUM UR-SCNC: 44.5 MMOL/L
PROT SERPL-MCNC: 7.2 G/DL (ref 6.4–8.9)
RBC # BLD AUTO: 2.39 10^6 /UL (ref 4.18–5.48)
RBC UR QL AUTO: (no result)
SODIUM SERPL-SCNC: 131 MMOL/L (ref 135–145)
SODIUM UR-SCNC: 76 MMOL/L
TROPONIN I SERPL-MCNC: 0.38 NG/ML (ref ?–0.03)
TROPONIN I SERPL-MCNC: 0.42 NG/ML (ref ?–0.03)
TROPONIN I SERPL-MCNC: 0.53 NG/ML (ref ?–0.03)
UUN UR-MCNC: 411 MG/DL
WBC # BLD AUTO: 8.9 10^3/UL (ref 3.5–10.8)
WBC UR QL AUTO: (no result)

## 2019-12-30 PROCEDURE — 80053 COMPREHEN METABOLIC PANEL: CPT

## 2019-12-30 PROCEDURE — 36415 COLL VENOUS BLD VENIPUNCTURE: CPT

## 2019-12-30 PROCEDURE — 86922 COMPATIBILITY TEST ANTIGLOB: CPT

## 2019-12-30 PROCEDURE — 86850 RBC ANTIBODY SCREEN: CPT

## 2019-12-30 PROCEDURE — 74176 CT ABD & PELVIS W/O CONTRAST: CPT

## 2019-12-30 PROCEDURE — 71045 X-RAY EXAM CHEST 1 VIEW: CPT

## 2019-12-30 PROCEDURE — 84300 ASSAY OF URINE SODIUM: CPT

## 2019-12-30 PROCEDURE — 83605 ASSAY OF LACTIC ACID: CPT

## 2019-12-30 PROCEDURE — 83735 ASSAY OF MAGNESIUM: CPT

## 2019-12-30 PROCEDURE — 84133 ASSAY OF URINE POTASSIUM: CPT

## 2019-12-30 PROCEDURE — 82570 ASSAY OF URINE CREATININE: CPT

## 2019-12-30 PROCEDURE — 86901 BLOOD TYPING SEROLOGIC RH(D): CPT

## 2019-12-30 PROCEDURE — 84484 ASSAY OF TROPONIN QUANT: CPT

## 2019-12-30 PROCEDURE — 87086 URINE CULTURE/COLONY COUNT: CPT

## 2019-12-30 PROCEDURE — 85025 COMPLETE CBC W/AUTO DIFF WBC: CPT

## 2019-12-30 PROCEDURE — 85610 PROTHROMBIN TIME: CPT

## 2019-12-30 PROCEDURE — 80061 LIPID PANEL: CPT

## 2019-12-30 PROCEDURE — 81003 URINALYSIS AUTO W/O SCOPE: CPT

## 2019-12-30 PROCEDURE — 94640 AIRWAY INHALATION TREATMENT: CPT

## 2019-12-30 PROCEDURE — P9040 RBC LEUKOREDUCED IRRADIATED: HCPCS

## 2019-12-30 PROCEDURE — 82436 ASSAY OF URINE CHLORIDE: CPT

## 2019-12-30 PROCEDURE — 93005 ELECTROCARDIOGRAM TRACING: CPT

## 2019-12-30 PROCEDURE — 99284 EMERGENCY DEPT VISIT MOD MDM: CPT

## 2019-12-30 PROCEDURE — 84540 ASSAY OF URINE/UREA-N: CPT

## 2019-12-30 PROCEDURE — 80048 BASIC METABOLIC PNL TOTAL CA: CPT

## 2019-12-30 PROCEDURE — 81015 MICROSCOPIC EXAM OF URINE: CPT

## 2019-12-30 PROCEDURE — 83880 ASSAY OF NATRIURETIC PEPTIDE: CPT

## 2019-12-30 PROCEDURE — 86900 BLOOD TYPING SEROLOGIC ABO: CPT

## 2019-12-30 PROCEDURE — 85730 THROMBOPLASTIN TIME PARTIAL: CPT

## 2019-12-30 NOTE — XMS REPORT
Continuity of Care Document (CCD)

 Created on:2019



Patient:Carmelo Vazquez JR

Sex:Male

:1932

External Reference #:[id number]





Demographics







 Address  [address] [place] [id number]

 

 Home Phone  [phone]

 

 Mobile Phone  [phone]

 

 Preferred Language  en

 

 Marital Status  Declined to Specify/Unknown

 

 Christianity Affiliation  Unknown

 

 Race  White

 

 Ethnic Group  Declined to Specify/Unknown









Author







 Name  Kamilah Greenwood MD

 

 Address  [address] [place] [id number]









Care Team Providers







 Name  Role  Phone

 

 Mateusz Tran MD - Pediatric  Care Team Information   [phone]



 Dermatology    

 

 Davey Castro M.D. - Rheumatology  Care Team Information   [phone]
[phone]

 

 [facility] -  Care Team Information   [phone]
[phone]



 ref - Home Health    









Problems







 Active Problems  Provider  Date

 

 Benign essential hypertension  Cholo Boo M.D.  Onset: 2004

 

 Disorder of lipid metabolism  Cholo Boo M.D.  Onset: 2004

 

 Impaired fasting glycaemia  Cholo Boo M.D.  Onset: 2011

 

 Low back pain  Cholo Boo M.D.  Onset: 2016

 

 Hypo-osmolality and or hyponatremia  Cholo Boo M.D.  Onset: 2016

 

 Disorder of fatty acid metabolism  Cholo Boo M.D.  Onset: 2016

 

 Essential hypertension  Cholo Boo M.D.  Onset: 2017

 

 Chronic diastolic heart failure  Cholo Boo M.D.  Onset: 2018







Social History







 Type  Date  Description  Comments

 

 Birth Sex    Unknown  

 

 Tobacco Use  Reviewed: 07/24/15  Never Smoked Cigarettes  

 

 Smoking Status  Reviewed: 18  Never Smoked Cigarettes  

 

 ETOH Use    Rare Alcohol Use  

 

 Tobacco Use  Start: Unknown  Patient has never smoked  







Allergies, Adverse Reactions, Alerts







 Active Allergies  Reaction  Severity  Comments  Date

 

 Simvastatin      Myalgias (normal CPK)  2007







Medications







 Active Medications  SIG  Qnty  Indications  Ordering  Date



         Provider  

 

 Folic Acid  1 by mouth every      Unknown  2019



           1mg  day        



 Tablets          



           

 

 Multivitamin Adult  1 by mouth every      Unknown  2019



   day        



 Tablets          



           

 

 Vitamin B12  1 tablet by mouth      Unknown  2019



            500mcg  every day        



 Tablets          



           

 

 Vitamin D  1 tablet by mouth      Unknown  10/16/2019



          1000Unit  daily        



 Tablets          



           

 

 Potassium Chloride  Take 1 Tablet By  180tabs    Ema,  2019



 [facility]  Mouth Twice Daily      BEHZAD Emmanuel  



        20Meq Tablets  For Potassium        



 ER  Replacement        

 

 Hospital Bed With      R54  Ema,  2019



 Side Rails        BEHZAD Emmanuel  

 

 Over The Bed Tray    1units  R54  Ema,  2019



 Table        BEHZAD Emmanuel  

 

 Metoprolol Succinate  Take 1 Tablet By  60tabs  I50.20  Ema,  2018



 ER  Mouth Twice Daily:      BEHZAD Emmanuel  



   25mg Tablets ER  Maximum Daily Dose        



 24HR  Is 2 Tablets        



           









 I50.32

 

 I10









 Furosemide  Take 3 Tablets By  90tabs  I50.20  Cholo Boo,  2018



         20mg Tablets  Mouth Every Morning      M.DMaureen  



   For Edema/ Heart        



   Failure        









 I50.32









 Hydroxychloroquine Sulfate  take 2 tablets by  30tabs    Unknown  2018



                     200mg  mouth daily        



 Tablets          

 

 Omeprazole  take 1 capsule by  90caps    Ema,  2018



     20mg Capsules DR  mouth daily(for      BEHZAD Emmanuel  



   acid suppression)        

 

 Aspirin Ec  1 pill daily for      Unknown  2013



     81mg Tablets   heart disease        



   prevention        

 

 Atorvastatin Calcium  Take 1 Tablet By  90tabs  E71.30  Ema,  2012



               10mg Tablets  Mouth Once Daily      BEHZAD Emmanuel  



   For Cholesterol        









 History Medications









 Sulfamethoxazole/Trimethoprim DS  1 by mouth  14tabs  R30.0  Ema,  2019 -



                    800-160mg  twice a day      BEHZAD Emmanuel  2019



 Tablets  for urinary        



   tract        



   infection        









 N39.0









 Segura Catheter  16 Mongolian, 5 ml in      Cholo Boo,  10/15/2019 -



   the BEHZAD wilson  2019



   change monthly;        



   irrigate w/60 ml        



   normal saline if        



   no urine output        



   within 8 hours.        

 

 Sulfamethoxazole/  1 by mouth twice a  14tabs  R30.0  Cholo Boo,  2019 -



 Trimethoprim DS  day for infection      M.D.  2019



           



 800-160mg Tablets          



           

 

 Voltaren  apply up to 2grams  300units    Cholo Boo,  2019 -



            1% Gel  to each knee      M.DMaureen  2019



   4x/day; for knee        



   arthritis        







Medications Administered in Office







 Medication  SIG  Qnty  Indications  Ordering Provider  Date

 

 H1N1 Swine Flu Vaccine        Cholo Boo M.D.  2010



             Injection          



           







Immunizations







 CPT Code  Status  Date  Vaccine  Lot #

 

 [id number]  Given  2018  Influenza Vaccine Split Virus Preservative Free Im  
[id number]



       Use (hi-dose)  

 

 [id number]  Given  10/03/2017  Influenza Virus Vaccine, Quadrivalent, Split,  



       Preservative Free  

 

 62771  Given  2016  Influenza Vaccine Split Virus Preservative Free Im  



       Use (hi-dose)  

 

 00699  Given  2015  Influenza Vaccine Split Virus Preservative Free Im  



       Use (hi-dose)  

 

 17624  Given  2015  Prevnar 13  [id number]

 

 57656  Given  10/03/2013  Flu, Split Virus 3Yrs  

 

 92529  Given  2012  Flu, Split Virus 3Yrs  

 

 32680  Given  2012  Adacel or Boostrix, TDaP  [id number]

 

 33767  Given  2010  Flu, Split Virus 3Yrs  [id number]

 

 45956  Given  2009  Flu, Split Virus 3Yrs  [id number]

 

 89584  Given  2008  Flu, Split Virus 3Yrs  [id number]

 

 48847  Given  2007  Flu, Split Virus 3Yrs  [id number]

 

 00484  Given  10/21/2005  Pneumococcal Immunization  

 

 90538  Given  2002  Td Immunization  









 









 95023  Refused  2007  Zostavax  







Vital Signs







 Date  Vital  Result  Comment

 

 2019  2:39pm  BP Systolic  131 mmHg  









 BP Diastolic  67 mmHg  

 

 Heart Rate  84 /min  

 

 O2 % BldC Oximetry  93 %  

 

 Body Temperature  98.1 F  temporal









 2019  9:23am  BP Systolic  130 mmHg  









 BP Diastolic  80 mmHg  

 

 Heart Rate  76 /min  reg

 

 Respiratory Rate  12 /min  not laboured







Results







 Test  Acquired Date  Facility  Test  Result  H/L  Range  Note

 

 Urine Culture And  2019  [facility]  Urine  SEE RESULT      1



 Sensitivities    [phone]  Culture  BELOW      

 

 Urinalysis Profile  2019  [facility]  Urine Color  Yellow      



     [phone]          









 Urine Appearance  Cloudy      

 

 Urine Specific Gravity  1.011  Normal  1.010-1.030  

 

 Urine pH  5.0  Normal  5-9  

 

 Urine Urobilinogen  Negative    Negative  

 

 Urine Ketones  Negative    Negative  

 

 Urine Protein  2+(100 mg/dL)  Abnormal  Negative  

 

 Urine Leukocytes  3+  Abnormal  Negative  

 

 Urine Blood  3+  Abnormal  Negative  

 

 Urine Nitrite  Negative    Negative  

 

 Urine Bilirubin  Negative    Negative  

 

 Urine Glucose  Negative    Negative  

 

 Urine White Blood Cell  3+(>20/hpf)  Abnormal  Absent  

 

 Urine Red Blood Cell  3+(>10/hpf)  Abnormal  Absent  

 

 Urine Bacteria  1+  Abnormal  Absent  









 Laboratory test  2019  [facility]  Troponin-I  0.05  Critical  <0.03
  2



 finding    [phone]  (TnI)  ng/mL  high    









 Alcohol  < 10 mg/dL  Normal  <10  3

 

 Point of Care Glucose  44 mg/dL  Low    4









 Comp Metabolic Panel  2019  [facility]  Sodium  134 mmol/L  Low  135-
145  



     [phone]          









 Blood Urea Nitrogen  66 mg/dL  High  6-24  

 

 Creatinine  3.98 mg/dL  High  0.67-1.17  

 

 BUN/Creatinine Ratio  16.6  Normal  8-20  

 

 Calcium  8.8 mg/dL  Normal  8.6-10.3  

 

 Total Protein  7.1 g/dL  Normal  6.4-8.9  

 

 Albumin  3.7 g/dL  Normal  3.2-5.2  

 

 Globulin  3.4 g/dL  Normal  2-4  

 

 Albumin/Globulin Ratio  1.1  Normal  1-3  

 

 Total Bilirubin  0.20 mg/dL  Normal  0.2-1.0  

 

 Alkaline Phosphatase  228 U/L  High    

 

 Alt  24 U/L  Normal  7-52  

 

 Egfr Non-  14.4    >60  

 

 Egfr   17.4    >60  5

 

 Chloride  117 mmol/L  High  101-111  

 

 Co2 Carbon Dioxide  8 mmol/L  Critical low  22-32  6

 

 Glucose  25 mg/dL  Critical low    7

 

 Potassium  TNP mmol/L    3.5-5.0  8

 

 Anion Gap  9 mmol/L  Normal  2-11  

 

 Ast  TNP U/L    13-39  9









 CBC Auto Diff  2019  [facility]  White Blood  9.0 10^3/uL  Normal  
3.5-10.8  



     [phone]  Count        









 Red Blood Count  2.93 10^6/uL  Low  4.18-5.48  

 

 Hemoglobin  9.9 g/dL  Low  14.0-18.0  

 

 Hematocrit  30 %  Low  42-52  

 

 Mean Corpuscular Volume  103 fL  High  80-94  

 

 Mean Corpuscular Hemoglobin  34 pg  High  27-31  

 

 Mean Corpuscular HGB Conc  33 g/dL  Normal  31-36  

 

 Red Cell Distribution Width  16 %  High  10-15  

 

 Platelet Count  173 10^3/uL  Normal  150-450  

 

 Mean Platelet Volume  10.9 fL  High  7.4-10.4  

 

 Abs Neutrophils  7.2 10^3/uL  Normal  1.5-7.7  

 

 Abs Lymphocytes  0.9 10^3/uL  Low  1.0-4.8  

 

 Abs Monocytes  0.8 10^3/uL  Normal  0-0.8  

 

 Abs Eosinophils  0.0 10^3/uL  Normal  0-0.6  

 

 Abs Basophils  0.0 10^3/uL  Normal  0-0.2  

 

 Abs Nucleated RBC  0.1 10^3/uL      

 

 Granulocyte %  80.3 %      

 

 Lymphocyte %  9.8 %      

 

 Monocyte %  8.9 %      

 

 Eosinophil %  0.6 %      

 

 Basophil %  0.4 %      

 

 Nucleated Red Blood Cells %  1.1      









 Laboratory test  2019  [facility]  Lactic Acid  0.8 mmol/L  Normal  
0.5-2.0  10



 finding    [phone]          

 

 Laboratory test  2019  [facility]  Point of Care  138 mg/dL  High  70
-100  11



 finding    [phone]  Glucose        

 

 Laboratory test  2019  [facility]  Ast Redraw  35 U/L  Normal  13-39
  



 finding    [phone]          









 Potassium Redraw  6.7 mmol/L  Critical high  3.5-5.0  12









 Laboratory test  2019  [facility]  Lactic Acid  1.1 mmol/L  Normal  
0.5-2.0  13



 finding    [phone]          

 

 Venous Blood Gas  2019  [facility]  Venous Blood  7.13  Low  7.32-
7.43  



     [phone]  pH        









 Venous Pco2  29 mmHg  Low  41-51  

 

 Venous Po2  < 38.0 mmHg  Normal  35-45  

 

 Venous O2 Saturation  47.7 %  Low  70-80  

 

 Venous Blood Base Excess  -18.2 mmol/L  Low  0.0-4.0  14

 

 Venous Bicarbonate Hco3  9.4 mmol/L  Low  24-28  









 Laboratory test  2019  [facility]  Partial  31.9 seconds  Normal  
26.0-38.0  



 finding    [phone]  Thrombo Time        



       PTT        

 

 Inr/Protime  2019  [facility]  Inr  1.03  Normal  0.82-1.09  15



     [phone]          

 

 Laboratory test  2019  [facility]  Point of  69 mg/dL  Low    
16



 finding    [phone]  Care Glucose        

 

 Basic Metabolic  2019  [facility]  Sodium  135 mmol/L  Normal  135-
145  



 Panel    [phone]          









 Glucose  191 mg/dL  High    

 

 Blood Urea Nitrogen  66 mg/dL  High  6-24  

 

 Creatinine  3.88 mg/dL  High  0.67-1.17  

 

 BUN/Creatinine Ratio  17.0  Normal  8-20  

 

 Calcium  10.2 mg/dL  Normal  8.6-10.3  

 

 Egfr Non-  14.8    >60  

 

 Egfr   17.9    >60  17

 

 Potassium  5.7 mmol/L  High  3.5-5.0  

 

 Chloride  117 mmol/L  High  101-111  

 

 Co2 Carbon Dioxide  8 mmol/L  Critical low  22-32  18

 

 Anion Gap  10 mmol/L  Normal  2-11  









 Venous Blood Gas  2019  [facility]  Venous Pco2  27 mmHg  Low  41-51
  



     [phone]          









 Venous Po2  < 38.0 mmHg  Normal  35-45  

 

 Venous O2 Saturation  61.1 %  Low  70-80  

 

 Venous Blood Base Excess  -17.2 mmol/L  Low  0.0-4.0  19

 

 Venous Bicarbonate Hco3  10.5 mmol/L  Low  24-28  

 

 Venous Blood pH  7.17  Low  7.32-7.43  









 Urinalysis Profile  2019  [facility]  Urine Color  Yellow      20



     [phone]          









 Urine Appearance  Turbid      

 

 Urine Specific Gravity  1.010  Normal  1.010-1.030  

 

 Urine pH  6.0  Normal  5-9  

 

 Urine Urobilinogen  Negative    Negative  

 

 Urine Ketones  Negative    Negative  

 

 Urine Protein  2+(100 mg/dL)  Abnormal  Negative  

 

 Urine Leukocytes  3+  Abnormal  Negative  

 

 Urine Blood  3+  Abnormal  Negative  

 

 Urine Nitrite  Negative    Negative  

 

 Urine Bilirubin  Negative    Negative  

 

 Urine Glucose  Negative    Negative  

 

 Urine White Blood Cell  3+(>20/hpf)  Abnormal  Absent  

 

 Urine Red Blood Cell  3+(>10/hpf)  Abnormal  Absent  

 

 Urine Bacteria  Absent    Absent  

 

 Urine Squamous Epithelial Cell  Present  Abnormal  Absent  









 Urine Culture And  2019  [facility]  Urine  SEE RESULT      21



 Sensitivities    [phone]  Culture  BELOW      

 

 Laboratory test  2019  [facility]  Troponin-I  0.29 ng/mL  Critical  
<0.0  22



 finding    [phone]  (TnI)    high  4  









 Osmolality Urine  279 mOsm/kg  Normal  100-1150  23









 Basic Metabolic Panel  2019  [facility]  Potassium  4.8 mmol/L  
Normal  3.5-5.0  



     [phone]          









 Chloride  91 mmol/L  Low  101-111  

 

 Co2 Carbon Dioxide  20 mmol/L  Low  22-32  

 

 Glucose  122 mg/dL  High    

 

 Blood Urea Nitrogen  29 mg/dL  High  6-24  

 

 Creatinine  2.33 mg/dL  High  0.67-1.17  

 

 BUN/Creatinine Ratio  12.4  Normal  8-20  

 

 Calcium  8.1 mg/dL  Low  8.6-10.3  

 

 Egfr Non-  26.6    >60  

 

 Egfr   32.2    >60  24

 

 Sodium  119 mmol/L  Critical low  135-145  25

 

 Anion Gap  8 mmol/L  Normal  2-11  









 Laboratory test  2019  [facility]  Lactic Acid  1.5  Normal  0.5-2.0
  26



 finding    [phone]    mmol/L      

 

 Electrolytes  2019  [facility]  Urine Sodium  81 mmol/L      



 Random Urine    [phone]  Concentration        









 Urine Potassium Concentration  23.9 mmol/L      

 

 Urine Chloride Concentration  94 mmol/L      









 Laboratory  2019  [facility]  Osmolality  267  Low  275-295  



 test finding    [phone]  Serum  mOsm/kg      

 

 Comp Metabolic  2019  [facility]  Sodium  118 mmol/L  Critical  135-
145  27



 Panel    [phone]      low    









 Potassium  4.6 mmol/L  Normal  3.5-5.0  

 

 Chloride  90 mmol/L  Low  101-111  

 

 Co2 Carbon Dioxide  19 mmol/L  Low  22-32  

 

 Anion Gap  9 mmol/L  Normal  2-11  

 

 Glucose  141 mg/dL  High    

 

 Blood Urea Nitrogen  27 mg/dL  High  6-24  

 

 Creatinine  2.33 mg/dL  High  0.67-1.17  

 

 BUN/Creatinine Ratio  11.6  Normal  8-20  

 

 Calcium  8.4 mg/dL  Low  8.6-10.3  

 

 Total Protein  7.3 g/dL  Normal  6.4-8.9  

 

 Albumin  3.6 g/dL  Normal  3.2-5.2  

 

 Globulin  3.7 g/dL  Normal  2-4  

 

 Albumin/Globulin Ratio  1.0  Normal  1-3  

 

 Total Bilirubin  0.40 mg/dL  Normal  0.2-1.0  

 

 Alkaline Phosphatase  184 U/L  High    

 

 Alt  18 U/L  Normal  7-52  

 

 Ast  25 U/L  Normal  13-39  

 

 Egfr Non-  26.6    >60  

 

 Egfr   32.2    >60  28









 Laboratory test  2019  [facility]  Troponin-I  0.27  Critical  <0.04
  29



 finding    [phone]  (TnI)  ng/mL  high    









 Blood Culture  SEE RESULT BELOW      30









 Urinalysis Profile  2019  [facility]  Urine Color  Yellow      31



     [phone]          









 Urine Appearance  Cloudy      

 

 Urine Specific Gravity  1.006  Low  1.010-1.030  

 

 Urine pH  6.0  Normal  5-9  

 

 Urine Urobilinogen  Negative    Negative  

 

 Urine Ketones  Negative    Negative  

 

 Urine Protein  1+(30 mg/dL)  Abnormal  Negative  

 

 Urine Leukocytes  3+  Abnormal  Negative  

 

 Urine Blood  2+  Abnormal  Negative  

 

 Urine Nitrite  Negative    Negative  

 

 Urine Bilirubin  Negative    Negative  

 

 Urine Glucose  Negative    Negative  

 

 Urine White Blood Cell  3+(>20/hpf)  Abnormal  Absent  

 

 Urine Red Blood Cell  3+(>10/hpf)  Abnormal  Absent  

 

 Urine Bacteria  1+  Abnormal  Absent  









 Urine Culture And  2019  [facility]  Urine Culture  SEE RESULT      
32



 Sensitivities    [phone]    BELOW      

 

 Urinalysis Profile  2019  [facility]  Urine Color  Yellow      33



     [phone]          









 Urine Appearance  Turbid      

 

 Urine Specific Gravity  1.009  Low  1.010-1.030  

 

 Urine pH  5.0  Normal  5-9  

 

 Urine Urobilinogen  Negative    Negative  

 

 Urine Ketones  Negative    Negative  

 

 Urine Protein  2+(100 mg/dL)  Abnormal  Negative  

 

 Urine Leukocytes  3+  Abnormal  Negative  

 

 Urine Blood  3+  Abnormal  Negative  

 

 Urine Nitrite  Positive  Abnormal  Negative  

 

 Urine Bilirubin  Negative    Negative  

 

 Urine Glucose  Negative    Negative  

 

 Urine White Blood Cell  3+(>20/hpf)  Abnormal  Absent  

 

 Urine Red Blood Cell  3+(>10/hpf)  Abnormal  Absent  

 

 Urine Bacteria  Absent    Absent  









 Urine Culture And  2019  [facility]  Urine Culture  SEE RESULT      
34



 Sensitivities    [phone]    BELOW      









 1  SEE RESULT BELOW



   Name:  CARMELO VAZQUEZ JR                 : 1932    Attend Dr: Le Garcias DO



   Acct:  [id number]  Unit: [id number]  AGE: 87            Location:  ICU    
    [id number]



   Re19                        SEX: M             Status:    ADM IN



   -----------------------------------------------------------------------------
---------------



   



   SPEC: [id number]       ALBERTO:  19-   [facility] DR: Fortunato Yoo MD



   REQ:  [id number]            RECD:  



   STATUS: [facility]STA BARRI[facility] DR: Cholo Boo MD



   _



   [facility]          [facility]



   ORDERED:  Urine Culture



   



   -----------------------------------------------------------------------------
---------------



   Procedure                         Result                         Reported   
        Site



   -----------------------------------------------------------------------------
---------------



   



   Urine Culture  Final                                             12/15/19-
0853      ML



   



   Organism 1                     ENTEROCOCCUS FAECALIS



   Doerun Count                >100,000 (Many) CFU/ML



   



   1. ENTEROCOCCUS FAECALIS



   M.I.C.      RX



   --------- ------



   Ampicillin                     <=2         S



   Penicillin                     4           S



   Ciprofloxacin                  1           S



   Gentamicin High Level                      S



   Levofloxacin                   1           S



   Linezolid                      2           S



   Nitrofurantoin                 <=16        S



   * Quinupristin/Dalfopristin      4           R



   * Streptomycin High Level                    S



   Tetracycline                   >=16        R



   Tigecycline                    <=0.12      S



   Vancomycin                     1           S



   Imipenem-Deduced                           S



   * Ampicillin/Sulbactam-Deduced               S



   



   * These antibiotics are not available in the [facility] Formulary



   



   Contact the Microbiology Department for any additional



   antibiotic reporting.



   -----------------------------------------------------------------------------
---------------



   * [facility] - Main Lab



   .



   



   ** END OF REPORT **



   



   DEPARTMENT OF PATHOLOGY,  [address] [place] [address]



   Phone # [phone]      Fax #[phone]



   Brendan Tran M.D. Director     [facility] # [id number]

 

 2  Result TnIDx:0.05 Called to [id number] at: 13:12:31 by:[id number] Read back by:
[id number]



   Troponin-I testing on Plasma Separator Tubes (PST) has a



   known false positive rate of 0.20-0.40%.  All positive



   troponins reflex immediately to secondary confirmatory



   testing.



   



   Using the TalkbitsI 800 Access Immunoassay systems, the



   99th percentile upper reference limit was demonstrated to be



   < 0.03 ng/mL.

 

 3  Critical Result CO2:8 Called to [id number] at: 13:12:31 by:[id number] Read back



   by:[id number]



   Critical Result GLU:25 Called to [id number] at: 13:12:31 by:[id number] Read back



   by:[id number]



   Critical Result K:TNP Called to [id number] at: 13:12:31 by:[id number] Read back



   by:[id number]



   Critical Result CO2:8 Called to [id number] at: 13:12:31 by:[id number] Read back



   by:[id number]



   Critical Result GLU:25 Called to [id number] at: 13:12:31 by:[id number] Read back



   by:[id number]



   Critical Result K:TNP Called to [id number] at: 13:12:31 by:[id number] Read back



   by:[id number]

 

 4  : [id number]

 

 5  *******Because ethnic data is not always readily available,



   this report includes an eGFR for both -Americans and



   non- Americans.****



   The National Kidney Disease Education Program (NKDEP) does



   not endorse the use of the MDRD equation for patients that



   are not between the ages of 18 and 70, are pregnant, have



   extremes of body size, muscle mass, or nutritional status,



   or are non- or non-.



   According to the National Kidney Foundation, irrespective of



   diagnosis, the stage of the disease is based on the level of



   kidney function:



   Stage Description                      GFR(mL/min/1.73 m(2))



   1     Kidney damage with normal or decreased GFR       90



   2     Kidney damage with mild decrease in GFR          60-89



   3     Moderate decrease in GFR                         30-59



   4     Severe decrease in GFR                           15-29



   5     Kidney failure                       <15 (or dialysis)

 

 6  Critical Result CO2:8 Called to [id number] at: 13:12:46 by:[id number] Read back



   by:[id number]

 

 7  Critical Result GLU:25 Called to NBV7519 at: 13:12:46 by:[id number] Read back



   by:[id number]

 

 8  Specimen Hemolyzed. Result may not be valid.



   Unable to report test result due to hemolysis.



   Critical Result K:TNP Called to [id number] at: 13:12:46 by:[id number] Read back



   by:[id number]

 

 9  Unable to report test result due to hemolysis.

 

 10  Specimen hemolyzed. Result may not be valid.



   [facility] Severe Sepsis and Septic Shock Management Bundle Measure



   requires all lactic acids initially measuring >2.0 mmol/L be



   repeated.

 

 11  : [id number]

 

 12  Critical Result K:6.7 Called to [id number] at: 15:40:58 by:[id number] Read back



   by:[id number]

 

 13  [facility] Severe Sepsis and Septic Shock Management Bundle Measure



   requires all lactic acids initially measuring >2.0 mmol/L be



   repeated.

 

 14  Reference ranges based on room air.

 

 15  Standard intensity warfarin therapeutic range: 2.0-3.0



   High intensity warfarin therapeutic range: 2.5-3.5

 

 16  : [id number]

 

 17  *******Because ethnic data is not always readily available,



   this report includes an eGFR for both -Americans and



   non- Americans.****



   The National Kidney Disease Education Program (NKDEP) does



   not endorse the use of the MDRD equation for patients that



   are not between the ages of 18 and 70, are pregnant, have



   extremes of body size, muscle mass, or nutritional status,



   or are non- or non-.



   According to the National Kidney Foundation, irrespective of



   diagnosis, the stage of the disease is based on the level of



   kidney function:



   Stage Description                      GFR(mL/min/1.73 m(2))



   1     Kidney damage with normal or decreased GFR       90



   2     Kidney damage with mild decrease in GFR          60-89



   3     Moderate decrease in GFR                         30-59



   4     Severe decrease in GFR                           15-29



   5     Kidney failure                       <15 (or dialysis)

 

 18  Verbal to [id number] by [id number] at 2133 on 19.



   Results read back accurately.

 

 19  Reference ranges based on room air.

 

 20  [id number]

 

 21  SEE RESULT BELOW



   -----------------------------------------------------------------------------
---------------



   Name:  CARMELO VAZQUEZ JR                 : 1932    Attend Dr: Cholo Boo MD



   Acct:  [id number]  Unit: [id number]  AGE: 87            Location:  [facility]



   Re19                        SEX: M             Status:    REG REF



   -----------------------------------------------------------------------------
---------------



   



   SPEC: [id number]       ALBERTO:     SUBM DR: Cholo Boo MD



   REQ:  [id number]            RECD:  11/15/



   STATUS:COMP



   _



   SOURCE: URINE          SPDESC:



   ORDERED:  Urine Culture



   COMMENTS: [id number]



   Urine Source: Random



   



   -----------------------------------------------------------------------------
---------------



   Procedure                         Result                         Reported   
        Site



   -----------------------------------------------------------------------------
---------------



   



   Urine Culture  Final                                             19-
830      ML



   



   Organism 1                     KLEBSIELLA  PNEUMONIAE



   Doerun Count                >100,000 (Many) CFU/ML



   Organism 2                     ESCHERICHIA COLI



   Doerun Count                >100,000 (Many) CFU/ML



   



   1. KLEBSIELLA  PNEUMONIAE



   M.I.C.      RX



   --------- ------



   Ampicillin                     >=32        R



   Cefazolin                      <=4         S



   Cefepime                       <=1         S



   Ceftriaxone                    <=1         S



   Ciprofloxacin                  <=0.25      S



   Gentamicin                     <=1         S



   Levofloxacin                   <=0.12      S



   Meropenem                      <=0.25      S



   Nitrofurantoin                 64          I



   Tetracycline                   <=1         S



   Pipercillin/Tazobactam         <=4         S



   Trimethoprim/Sulfamethoxazole  <=20        S



   Amoxicillin/Clavulanic Acid    <=2         S



   Aztreonam                      <=1         S



   



   



   



   



   



   ** CONTINUED ON NEXT PAGE **



   



   DEPARTMENT OF PATHOLOGY,  [address] [place] [address]



   Phone # [phone]      Fax #[phone]



   Brendan Tran M.D. Director     [facility] # [id number]



   



   



   



   -----------------------------------------------------------------------------
---------------



   Patient: CARMELO VAZQUEZ JR                  [id number]     (Continued)



   -----------------------------------------------------------------------------
---------------



   



   



   Specimen: [id number]    Collected: 19-  Received: 11/15/
    (Continued)



   



   -----------------------------------------------------------------------------
---------------



   Procedure                         Result                         Reported   
        Site



   -----------------------------------------------------------------------------
---------------



   



   Urine Culture  Final   (continued)                               830



   2. ESCHERICHIA COLI



   M.I.C.      RX



   --------- ------



   Ampicillin                     >=32        R



   Cefazolin                      <=4         S



   Cefepime                       <=1         S



   Ceftriaxone                    <=1         S



   Ciprofloxacin                  <=0.25      S



   Gentamicin                     <=1         S



   Levofloxacin                   <=0.12      S



   Meropenem                      <=0.25      S



   Nitrofurantoin                 64          I



   Tetracycline                   <=1         S



   Pipercillin/Tazobactam         <=4         S



   Trimethoprim/Sulfamethoxazole  <=20        S



   Amoxicillin/Clavulanic Acid    4           S



   Aztreonam                      <=1         S



   



   



   Contact the Microbiology Department for any additional



   antibiotic reporting.



   



   -----------------------------------------------------------------------------
---------------



   * ML - Main Lab



   .



   



   



   



   



   



   



   



   



   



   



   



   



   ** END OF REPORT **



   



   DEPARTMENT OF PATHOLOGY,  [address] [place] [address]



   Phone # [phone]      Fax #[phone]



   Brendan Tran M.D. Director     [facility] # [id number]

 

 22  Result TnIDx:0.29 Called to [id number] at: 03:15:12 by:[id number] Read back by:
[id number]



   Troponin-I testing on Plasma Separator Tubes (PST) has a



   known false positive rate of 0.20-0.40%.  All positive



   troponins reflex immediately to secondary confirmatory



   testing.



   



   Using the AimWith 800 Access Immunoassay systems, the



   99th percentile upper reference limit was demonstrated to be



   < 0.03 ng/mL.

 

 23  Length of time Urine Collected?: Random Cup/Unknown

 

 24  *******Because ethnic data is not always readily available,



   this report includes an eGFR for both -Americans and



   non- Americans.****



   The National Kidney Disease Education Program (NKDEP) does



   not endorse the use of the MDRD equation for patients that



   are not between the ages of 18 and 70, are pregnant, have



   extremes of body size, muscle mass, or nutritional status,



   or are non- or non-.



   According to the National Kidney Foundation, irrespective of



   diagnosis, the stage of the disease is based on the level of



   kidney function:



   Stage Description                      GFR(mL/min/1.73 m(2))



   1     Kidney damage with normal or decreased GFR       90



   2     Kidney damage with mild decrease in GFR          60-89



   3     Moderate decrease in GFR                         30-59



   4     Severe decrease in GFR                           15-29



   5     Kidney failure                       <15 (or dialysis)

 

 25  Critical Result NA:119 Called to [id number] at: 02:40:36 by:[id number] Read back



   by:[id number]

 

 26  [facility] Severe Sepsis and Septic Shock Management Bundle Measure



   requires all lactic acids initially measuring >2.0 mmol/L be



   repeated.

 

 27  Critical Result NA:118 Called to [id number] at: 23:26:25 by:[id number] Read back



   by:[id number]

 

 28  *******Because ethnic data is not always readily available,



   this report includes an eGFR for both -Americans and



   non- Americans.****



   The National Kidney Disease Education Program (NKDEP) does



   not endorse the use of the MDRD equation for patients that



   are not between the ages of 18 and 70, are pregnant, have



   extremes of body size, muscle mass, or nutritional status,



   or are non- or non-.



   According to the National Kidney Foundation, irrespective of



   diagnosis, the stage of the disease is based on the level of



   kidney function:



   Stage Description                      GFR(mL/min/1.73 m(2))



   1     Kidney damage with normal or decreased GFR       90



   2     Kidney damage with mild decrease in GFR          60-89



   3     Moderate decrease in GFR                         30-59



   4     Severe decrease in GFR                           15-29



   5     Kidney failure                       <15 (or dialysis)

 

 29  Critical Result Trop:0.27 Called to [id number] at: 23:26:25



   by:[id number] Read back by:[id number]



   Troponin-I testing on Plasma Separator Tubes (PST) has a



   known false positive rate of 0.20-0.40%.  All positive



   troponins reflex immediately to secondary confirmatory



   testing.



   



   Using the [employer] DxI 800 Access Immunoassay systems, the



   99th percentile upper reference limit was demonstrated to be



   < 0.03 ng/mL.

 

 30  SEE RESULT BELOW



   -----------------------------------------------------------------------------
---------------



   Name:  CARMELO VAZQUEZ JR                   : 1932    Attend Dr: 
Anita Bassett MD



   Acct:  [id number]  Unit: [id number]  AGE: 87            Location:  [place] [address]



   Re19      Dis:  19    SEX: M             Status:    DIS IN



   -----------------------------------------------------------------------------
---------------



   



   SPEC: [id number]         ALBERTO:       [facility] DR: Vaishnavi Padilla MD



   REQ:  [id number]              RECD:   



   STATUS: RASTA BARRIOS DR: Cholo Boo MD



   _



   SOURCE: BLOOD,VENO     SPDES:



   ORDERED:  Blood Cult



   



   -----------------------------------------------------------------------------
---------------



   Procedure                         Result                         Reported   
        Site



   -----------------------------------------------------------------------------
---------------



   Aerobic Culture Bottle  Final                                    19-
      ML



   No Growth Day 5



   



   Anaerobic Culture Bottle  Final                                  19-
      ML



   No Growth Day 5



   



   -----------------------------------------------------------------------------
---------------



   * ML - Main Lab



   .



   



   



   



   



   



   



   



   



   



   



   



   



   



   



   



   



   



   



   



   



   



   



   



   



   ** END OF REPORT **



   



   DEPARTMENT OF PATHOLOGY,  [address] [place] [address]



   Phone # [phone]      Fax #[phone]



   Brendan Tran M.D. Director     [facility] # [id number]

 

 31  [id number]

 

 32  SEE RESULT BELOW



   -----------------------------------------------------------------------------
---------------



   Name:  JAYDA GUERREROJALEESACARMELO                   : 1932    Attend Dr: Cholo Boo MD



   Acct:  [id number]  Unit: [id number]  AGE: 87            Location:  [facility]



   Re19                        SEX: M             Status:    REG REF



   -----------------------------------------------------------------------------
---------------



   



   SPEC: [id number]         ALBERTO:   19-0    [facility] DR: Cholo Boo MD



   REQ:  [id number]              RECD:   



   STATUS: COMP



   _



   SOURCE: URINE          SPDESC:



   ORDERED:  Urine Culture



   COMMENTS: [id number]



   QUERIES:  Urine Source: Random



   



   -----------------------------------------------------------------------------
---------------



   Procedure                         Result                         Reported   
        Site



   -----------------------------------------------------------------------------
---------------



   Urine Culture  Final                                             19-
0916      ML



   



   Organism 1                     ESCHERICHIA COLI



   Doerun Count                10-25,000 (Moderate) CFU/ML



   



   1. ESCHERICHIA COLI



   M.I.C.      RX



   --------- ------



   Ampicillin                     >=32        R



   Cefazolin                      <=4         S



   Cefepime                       <=1         S



   Ceftriaxone                    <=1         S



   Ciprofloxacin                  <=0.25      S



   Gentamicin                     <=1         S



   Levofloxacin                   <=0.12      S



   Meropenem                      <=0.25      S



   Nitrofurantoin                 32          S



   Tetracycline                   <=1         S



   Pipercillin/Tazobactam         <=4         S



   Trimethoprim/Sulfamethoxazole  <=20        S



   Amoxicillin/Clavulanic Acid    4           S



   Aztreonam                      <=1         S



   



   



   Contact the Microbiology Department for any additional



   antibiotic reporting.



   



   -----------------------------------------------------------------------------
---------------



   * ML - Main Lab



   .



   



   ** END OF REPORT **



   



   DEPARTMENT OF PATHOLOGY,  [address] [place] [address]



   Phone # [phone]      Fax #[phone]



   Brendan Tran M.D. Director     [facility] # [id number]

 

 33  [id number]

 

 34  SEE RESULT BELOW



   -----------------------------------------------------------------------------
---------------



   Name:  CARMELO VAZQUEZ JR                   : 1932    Attend Dr: Cholo Boo MD



   Acct:  [id number]  Unit: [id number]  AGE: 87            Location:  [facility]



   Re19                        SEX: M             Status:    REG REF



   -----------------------------------------------------------------------------
---------------



   



   SPEC: [id number]         ALBERTO:       SUBM DR: Cholo Boo MD



   REQ:  [id number]              RECD:       EXPLICIT FAX#: [id number]



   STATUS: COMP             OTHR DR: [facility]



   _



   SOURCE: URINE          SPDESC:



   ORDERED:  Urine Culture



   COMMENTS: [id number]



   QUERIES:  Urine Source: Random



   



   -----------------------------------------------------------------------------
---------------



   Procedure                         Result                         Reported   
        Site



   -----------------------------------------------------------------------------
---------------



   Urine Culture  Final                                             19-
1040      ML



   



   Organism 1                     ESCHERICHIA COLI



   Doerun Count                >100,000 (Many) CFU/ML



   



   1. ESCHERICHIA COLI



   M.I.C.      RX



   --------- ------



   Ampicillin                     >=32        R



   Cefazolin                      <=4         S



   Cefepime                       <=1         S



   Ceftriaxone                    <=1         S



   Ciprofloxacin                  <=0.25      S



   Gentamicin                     <=1         S



   Levofloxacin                   <=0.12      S



   Meropenem                      <=0.25      S



   Nitrofurantoin                 64          I



   Tetracycline                   <=1         S



   Pipercillin/Tazobactam         <=4         S



   Trimethoprim/Sulfamethoxazole  <=20        S



   Amoxicillin/Clavulanic Acid    4           S



   Aztreonam                      <=1         S



   



   



   Contact the Microbiology Department for any additional



   antibiotic reporting.



   



   -----------------------------------------------------------------------------
---------------



   * ML - Main Lab



   .



   



   ** END OF REPORT **



   



   DEPARTMENT OF PATHOLOGY,  [address] [place] [address]



   Phone # [phone]      Fax #[phone]



   Brendan Tran M.D. Director     [facility] # [id number]







Procedures







 Date  Code  Description  Status

 

 2004  [id number]  Colonoscopy  Completed







[employer]







 Description

 

 No Information Available







Encounters







 Type  Date  Location  Provider  Dx  Diagnosis

 

 Office Visit  2019  4:45p  Main Office  Cholo Boo M.D.  R53.81  
Other malaise









 R41.82  Altered mental status, unspecified

 

 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

 

 N18.3  Chronic kidney disease, stage 3 (moderate)

 

 R54  Age-related physical debility

 

 Z74.01  Bed confinement status

 

 I10  Essential (primary) hypertension







Assessments







 Date  Code  Description  Provider

 

 2019  R06.00  Dyspnea, unspecified  Kamilah Greenwood MD

 

 2019  R53.81  Other malaise  Kamilah Greenwood MD

 

 2019  N18.3  Chronic kidney disease, stage 3 (moderate)  Kamilah Greenwood MD

 

 2019  I50.32  Chronic diastolic (congestive) heart failure  Kamilah Greenwood MD

 

 2019  R53.81  Other malaise  Cholo Boo M.D.

 

 2019  R41.82  Altered mental status, unspecified  Cholo Boo M.D.

 

 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



     with heart failure and stage 1 through stage  



     4 chronic kidney disease, or unspecified  



     chronic kidney disease  

 

 2019  N18.3  Chronic kidney disease, stage 3 (moderate)  Cholo Boo M.D.

 

 2019  R54  Age-related physical debility  Cholo Boo M.D.

 

 2019  Z74.01  Bed confinement status  Cholo Boo M.D.

 

 2019  I10  Essential (primary) hypertension  Cholo Boo M.D.







Plan of Treatment

2019 - Kamilah Greenwood, MDR06.00 Dyspnea, unspecifiedComments:ambulance 
called to transport to [facility]53.81 Other fjddcnwJ82.3 Chronic kidney disease, 
stage 3 (moderate)I50.32 Chronic diastolic (congestive) heart failure



Functional Status







 Description

 

 No Information Available







Mental Status







 Description

 

 No Information Available







Referrals







 Refer to   Reason for Referral  Status  Appt Date

 

 Jonh Sierra MD  88yo homebound man w/ indwelling Segura catheter.  Sent  



   Visiting  nurse went to change Segura today as it    



   was not draining, able to irrigate Segura and get    



   out 300cc, then changed catheter but having    



   difficulty b/o balloon would not hold full 10cc    



   (only 6cc) and pt c/o pain. Please assist with    



   Segura management  Consult and Co-treatment    









 [facility]

 

 [address]

 

 [place] [address] [phone]

 

 









 [facility]  Homebound pt, needs assistance  Closed  



 - ref  with Segura catheter changes  sent    



   via s order form    









 & [facility]  -  Referrals

 

 [address]

 

 [place] [address] [phone]

## 2019-12-30 NOTE — XMS REPORT
Patient Summary Document

 Created on:2019



Patient:OBDULIA MONTELONGO JR Unknown

Sex:Male

:1932

External Reference #:[id number]





Demographics







 Address  [address] [place] [address]

 

 Home Phone  [phone]

 

 Preferred Language  English

 

 Marital Status  Unknown

 

 Jew Affiliation  Unknown

 

 Race  Unknown

 

 Ethnic Group  Unknown









Author







 Organization  [facility]









Support







 Name  Relationship  Address  Phone

 

 Unknown Name, Unknown Name  NextOfKin  Unknown Address  Unavailable









Care Team Providers







 Name  Role  Phone

 

 Unavailable  Unavailable  Unavailable









Problems







 Condition  Condition  Condition  Status  Onset  Resolution  Last  Treating  
Comments



 Name  Details  Category    Date  Date  Treatment  Clinician  



             Date    

 

 Hypertensiv  Hypertensiv  Diagnosis  Active        Angelic  



 e heart and  e heart and            Ingrahm  



 chronic  chronic            [id number]  



 kidney  kidney              



 disease  disease              



 with heart  with heart              



 failure and  failure and              



 stage 1  stage 1              



 through  through              



 stage 4  stage 4              



 chronic  chronic              



 kidney  kidney              



 disease, or  disease, or              



 unspecified  unspecified              



 chronic  chronic              



 kidney  kidney              



 disease  disease              

 

 Chronic  Chronic  Diagnosis  Active        Angelic  



 diastolic  diastolic            Ingrm  



 (congestive  (congestive            [id number]  



 ) heart  ) heart              



 failure  failure              

 

 Chronic  Chronic  Diagnosis  Active        Angelic  



 [facility]            [facility]  



 disease,  disease,            [id number]  



 stage 3  stage 3              



 (moderate)  (moderate)              

 

 Encounter  Encounter  Diagnosis  Active  2019      Angelic  



 for fitting  for fitting      0-16      [facility]  



 and  and            [id number]  



 adjustment  adjustment              



 of urinary  of urinary              



 device  device              

 

 Sick sinus  Sick sinus  Diagnosis  Active        Angelic  



 syndrome  syndrome            [facility]  



               [id number]  

 

 Atheroscler  Atheroscler  Diagnosis  Active        Angelic  



 otic heart  otic heart            Ingrm  



 disease of  disease of            [id number]  



 native  native              



 coronary  coronary              



 artery  artery              



 without  without              



 angina  angina              



 pectoris  pectoris              

 

 Bilateral  Bilateral  Diagnosis  Active        Angelic  



 primary  primary            Ingrahm  



 osteoarthri  osteoarthri            [id number]  



 tis of knee  tis of knee              

 

 Other  Other  Diagnosis  Active        Angelic  



 interverteb  interverteb            Ingrm  



 ral disc  ral disc            [id number]  



 degeneratio  degeneratio              



 n, lumbar  n, lumbar              



 region  region              

 

 Other  Other  Diagnosis  Active        Angelic  



 malaise  malaise            Ingrahm  



               [id number]  

 

 Old  Old  Diagnosis  Active        Angelic  



 myocardial  myocardial            Ingrahm  



 infarction  infarction            [id number]  

 

 Long term  Long term  Diagnosis  Active        Angelic  



 (current)  (current)            Ingrm  



 use of  use of            [id number]  



 aspirin  aspirin              

 

 Long term  Long term  Diagnosis  Active        Angelic  



 (current)  (current)            Ingrahm  



 use of  use of            [id number]  



 opiate  opiate              



 analgesic  analgesic              

 

 Presence of  Presence of  Diagnosis  Active        Angelic  



 aortocorona  aortocorona            Ingrahm  



 ry bypass  ry bypass            [id number]  



 graft  graft              

 

 Presence of  Presence of  Diagnosis  Active        Angelic  



 cardiac  cardiac            Ingrahm  



 pacemaker  pacemaker            [id number]  

 

 Personal  Personal  Diagnosis  Active        Angelic  



 history of  history of            Ingra[facility]  



 pneumonia  pneumonia            [id number]  



 (recurrent)  (recurrent)              

 

 Pain  frequent  Pain Mgmt  Resolve  2019    [facility]  



   pain    d  0-16  11:00:00    [facility]  



         10:00:      [id number]  



         00        

 

 Respiratory  lung sounds  Respirator  Resolve  2019    [facility]  



   deficit  y  d  0-16  11:00:00    [facility]  



         10:00:      [id number]  



         00        

 

 Respiratory  dyspnea  Respirator  Active  2019      [facility]  



   present  y    0-16      [facility]  



         10:00:      [id number]  



         00        

 

 Endo/Hema  anti-coagul  Endo/Hema  Resolve  2019    [facility]  



   ation    d  0-16  11:00:00    [facility]  



   therapy      10:00:      [id number]  



         00        

 

 Sensory  impaired  Sensory  Active  2019      [facility]  



   hearing      0-16      [facility]  



         10:00:      [id number]  



         00        

 

 Integument  skin  Integument  Resolve  2019    [facility]  



   integrity    d  0-16  11:00:00    [facility]  



   risk      10:00:      [id number]  



         00        

 

 Nutrition  nutritional  Nutrition  Resolve  2019    [facility]  



   restriction    d  0-16  11:00:00    [facility]  



   s      10:00:      [id number]  



         00        

 

 Elimination  catheter  Eliminatio  Resolve  2019    [facility]  



   present  n  d  0-16  11:00:00    [facility]  



         10:00:      [id number]  



         00        

 

 Neuro  confusion  Neuro/Emot  Active  2019      [facility]  



   present  ion    0-16      [facility]  



         10:00:      [id number]  



         00        

 

 Neuro  impaired  Neuro/Emot  Active  2019      [facility]  



   decision-ma  ion    0-16      [facility]  



   evelin      10:00:      [id number]  



         00        

 

 Activity  ADL  Activity  Active  2019      [facility]  



   assistance      0-16      [facility]  



   required      10:00:      [id number]  



         00        

 

 Activity  self-care  Activity  Resolve  2019    [facility]  



   deficit    d  0-16  11:00:00    [facility]  



         10:00:      [id number]  



         00        

 

 Safety  fall risk  Safety  Active  2019      [facility]  



   factor      0-16      [facility]  



   present      10:00:      [id number]  



         00        

 

 Safety  risk for  Safety  Active  2019      [facility]  



   hospitaliza      0-16      [facility]  



   tion      10:00:      [id number]  



         00        

 

 Medication  oral med  Meds  Active  2019      [facility]  



   assistance      0-16      [facility]  



   required      10:00:      [id number]  



         00        

 

 Musculoskel  transfer  Musculoske  Resolve  2019    [facility]  



 etal  assistance  letal  d  0-16  11:00:00    [place]  



   required      10:00:      [id number]  



         00        

 

 Musculoskel  requires  Musculoske  Resolve  2019    [facility]  



 etal  human  letal  d  0-16  11:00:00    [place]  



   assist to      10:00:      [id number]  



   leave home      00        

 

 Safety  cannot be  Safety  Active  2019      Angelic  



   left alone      1-12      Ingrahm  



         10:30:      [id number]  



         00        

 

 Safety  can be left  Safety  Active  2019      Angelic  



   alone for      1-12      [facility]gra[facility]  



   only short      10:30:      [id number]  



   periods      00        







Allergies, Adverse Reactions, Alerts







 Allergy Name  Allergy  Status  Severity  Reaction(s)  Onset  Inactive  
Treating  Comments



   Type        Date  Date  Clinician  

 

 simvastatin  Base  Active  Unknown  Reaction  2019    Jael Beam  



   Ingredient      Unknown  0-15      







Medications







 Ordered  Filled  Start  Stop  Current  Ordering  Indication  Dosage  Frequency
  Signature  Comments  Components



 Medication  Medication  Date  Date  Medication?  Clinician        (SIG)    



 Name  Name                    

 

 Klor-Con  Klor-Con  2019    Yes  Silcoff    Unknown  Unknown      



 M20 mEq  M20 mEq  0-16      Cholo TORRES            



 tablet,exte  tablet,exte                    



 nded  nded                    



 release  release                    

 

 Tums 200 mg  Tums 200 mg  2019    Yes  Silcoff    Unknown  Unknown      



 calcium  calcium  0-16      Cholo TORRES            



 (500 mg)  (500 mg)                    



 chewable  chewable                    



 tablet  tablet                    

 

 Acetaminoph  Acetaminoph  2019    Yes  Silcoff    Unknown  Unknown      



 en Pain  en Pain  0-16      Cholo TORRES            



 Relief 500  Relief 500                    



 mg tablet  mg tablet                    

 

 oxyCODONE-a  oxyCODONE-a  2019    Yes  Silcoff    Unknown  Unknown      



 cetaminophe  cetaminophe  0-16      Cholo TORRES            



 n 5 mg-325  n 5 mg-325                    



 mg tablet  mg tablet                    

 

 furosemide  furosemide  2019    Yes  Silcoff    Unknown  Unknown      



 20 mg  20 mg  0-16      Cholo TORRES            



 tablet  tablet                    

 

 metoprolol  metoprolol  2019    Yes  Silcoff    Unknown  Unknown      



 succinate  succinate  0-16      Cholo TORRES            



 ER 25 mg  ER 25 mg                    



 tablet,exte  tablet,exte                    



 nded  nded                    



 release 24  release 24                    



 hr  hr                    

 

 hydroxychlo  hydroxychlo  2019    Yes  Silcoff    Unknown  Unknown      



 roquine 200  roquine 200  0-16      MD,Cholo            



 mg tablet  mg tablet                    

 

 omeprazole  omeprazole  2019    Yes  Silcoff    Unknown  Unknown      



 20 mg  20 mg  0-16      MD,Cholo            



 capsule,del  capsule,del                    



 ayed  ayed                    



 release  release                    

 

 Aspirin Low  Aspirin Low  2019    Yes  Silcoff    Unknown  Unknown      



 Dose 81 mg  Dose 81 mg  0-16      MD,Cholo            



 tablet,dina  tablet,dina                    



 yed release  yed release                    

 

 atorvastati  atorvastati  2019    Yes  Silcoff    Unknown  Unknown      



 n 10 mg  n 10 mg  0-16      MD,Cholo            



 tablet  tablet                    







Vital Signs







 Vital Name  Observation Time  Observation Value  Comments

 

 SYSTOLIC mm[Hg]  2019 18:09:09  120 mm[Hg] mm[Hg]  Method: Sit

 

 SYSTOLIC mm[Hg]  2019-10-16 18:08:22  128 mm[Hg] mm[Hg]  Method: Stand

 

 DIASTOLIC mm[Hg]  2019 18:09:09  68 mm[Hg] mm[Hg]  Method: Sit

 

 DIASTOLIC mm[Hg]  2019-10-16 18:08:22  70 mm[Hg] mm[Hg]  Method: Stand

 

 PULSE  2019 18:09:09  66 /min /min  

 

 RESP RATE  2019 18:09:09  16 /min /min  

 

 TEMP  2019 18:09:09  97.4 [degF]  







Procedures

This patient has no known procedures.



Results

This patient has no known results.

## 2019-12-30 NOTE — XMS REPORT
Patient Summary Document

 Created on:2019



Patient:OBDULIA MONTELONGO JR Unknown

Sex:Male

:1932

External Reference #:[id number]





Demographics







 Address  [address] [place] [address]

 

 Home Phone  [phone]

 

 Preferred Language  English

 

 Marital Status  Unknown

 

 Hinduism Affiliation  Unknown

 

 Race  Unknown

 

 Ethnic Group  Unknown









Author







 Organization  [facility]









Support







 Name  Relationship  Address  Phone

 

 Unknown Name, Unknown Name  NextOfKin  Unknown Address  Unavailable









Care Team Providers







 Name  Role  Phone

 

 Unavailable  Unavailable  Unavailable









Problems







 Condition  Condition  Condition  Status  Onset  Resolution  Last  Treating  
Comments



 Name  Details  Category    Date  Date  Treatment  Clinician  



             Date    

 

 Hypertensiv  Hypertensiv  Diagnosis  Active        Angelic  



 e heart and  e heart and            Ingrahm  



 chronic  chronic            [id number]  



 kidney  kidney              



 disease  disease              



 with heart  with heart              



 failure and  failure and              



 stage 1  stage 1              



 through  through              



 stage 4  stage 4              



 chronic  chronic              



 kidney  kidney              



 disease, or  disease, or              



 unspecified  unspecified              



 chronic  chronic              



 kidney  kidney              



 disease  disease              

 

 Chronic  Chronic  Diagnosis  Active        Angelic  



 diastolic  diastolic            Ingr[facility]  



 (congestive  (congestive            [id number]  



 ) heart  ) heart              



 failure  failure              

 

 Chronic  Chronic  Diagnosis  Active        Angelic  



 [facility]  kidney            [facility]  



 disease,  disease,            [id number]  



 stage 3  stage 3              



 (moderate)  (moderate)              

 

 Encounter  Encounter  Diagnosis  Active  2019      Angelic  



 for fitting  for fitting      0-16      [facility]  



 and  and            [id number]  



 adjustment  adjustment              



 of urinary  of urinary              



 device  device              

 

 Sick sinus  Sick sinus  Diagnosis  Active        Angelic  



 syndrome  syndrome            [facility]  



               [id number]  

 

 Atheroscler  Atheroscler  Diagnosis  Active        Angelic  



 otic heart  otic heart            Ingrm  



 disease of  disease of            [id number]  



 native  native              



 coronary  coronary              



 artery  artery              



 without  without              



 angina  angina              



 pectoris  pectoris              

 

 Bilateral  Bilateral  Diagnosis  Active        Angelic  



 primary  primary            Ingrahm  



 osteoarthri  osteoarthri            [id number]  



 tis of knee  tis of knee              

 

 Other  Other  Diagnosis  Active        Angelic  



 interverteb  interverteb            Ingr[facility]  



 ral disc  ral disc            [id number]  



 degeneratio  degeneratio              



 n, lumbar  n, lumbar              



 region  region              

 

 Other  Other  Diagnosis  Active        Angelic  



 malaise  malaise            Ingrahm  



               [id number]  

 

 Old  Old  Diagnosis  Active        Angelic  



 myocardial  myocardial            Ingrahm  



 infarction  infarction            [id number]  

 

 Long term  Long term  Diagnosis  Active        Angelic  



 (current)  (current)            Ingrm  



 use of  use of            [id number]  



 aspirin  aspirin              

 

 Long term  Long term  Diagnosis  Active        Angelic  



 (current)  (current)            Ingrahm  



 use of  use of            [id number]  



 opiate  opiate              



 analgesic  analgesic              

 

 Presence of  Presence of  Diagnosis  Active        Angelic  



 aortocorona  aortocorona            Ingrahm  



 ry bypass  ry bypass            [id number]  



 graft  graft              

 

 Presence of  Presence of  Diagnosis  Active        Angelic  



 cardiac  cardiac            Ingrahm  



 pacemaker  pacemaker            [id number]  

 

 Personal  Personal  Diagnosis  Active        Angelic  



 history of  history of            Ingrahm  



 pneumonia  pneumonia            [id number]  



 (recurrent)  (recurrent)              

 

 Pain  frequent  Pain Mgmt  Resolve  2019    [facility]  



   pain    d  0-16  11:00:00    Zebulon  



         10:00:      [id number]  



         00        

 

 Respiratory  lung sounds  Respirator  Resolve  2019    [facility]  



   deficit  y  d  0-16  11:00:00    Zebulon  



         10:00:      [id number]  



         00        

 

 Respiratory  dyspnea  Respirator  Active  2019      [facility]  



   present  y    0-16      Zebulon  



         10:00:      [id number]  



         00        

 

 Endo/Hema  anti-coagul  Endo/Hema  Resolve  2019    [facility]  



   ation    d  0-16  11:00:00    [facility]on  



   therapy      10:00:      [id number]  



         00        

 

 Sensory  impaired  Sensory  Active  2019      [facility]  



   hearing      0-16      Zebulon  



         10:00:      [id number]  



         00        

 

 Integument  skin  Integument  Resolve  2019    [facility]  



   integrity    d  0-16  11:00:00    [facility]ebulon  



   risk      10:00:      [id number]  



         00        

 

 Nutrition  nutritional  Nutrition  Resolve  2019    [facility]  



   restriction    d  0-16  11:00:00    [facility]on  



   s      10:00:      [id number]  



         00        

 

 Elimination  catheter  Eliminatio  Resolve  2019    [facility]  



   present  n  d  0-16  11:00:00    [facility]on  



         10:00:      [id number]  



         00        

 

 Neuro  confusion  Neuro/Emot  Active  2019      [facility]  



   present  ion    0-16      [facility]ebulon  



         10:00:      [id number]  



         00        

 

 Neuro  impaired  Neuro/Emot  Active  2019      [facility]  



   decision-ma  ion    0-16      [facility]ebulon  



   evelin      10:00:      [id number]  



         00        

 

 Activity  ADL  Activity  Active  2019      [facility]  



   assistance      0-16      [facility]  



   required      10:00:      [id number]  



         00        

 

 Activity  self-care  Activity  Resolve  2019    [facility]  



   deficit    d  0-16  11:00:00    [facility]ebulon  



         10:00:      [id number]  



         00        

 

 Safety  fall risk  Safety  Active  2019      [facility]  



   factor      0-16      [facility]on  



   present      10:00:      [id number]  



         00        

 

 Safety  risk for  Safety  Active  2019      [facility]  



   hospitaliza      0-16      [facility]ulon  



   tion      10:00:      [id number]  



         00        

 

 Medication  oral med  Meds  Active  2019      [facility]  



   assistance      0-16      [facility]on  



   required      10:00:      [id number]  



         00        

 

 Musculoskel  transfer  Musculoske  Resolve  2019    [facility]  



 etal  assistance  letal  d  0-16  11:00:00    [facility]on  



   required      10:00:      [id number]  



         00        

 

 Musculoskel  requires  Musculoske  Resolve  2019    [facility]  



 etal  human  letal  d  0-16  11:00:00    [facility]on  



   assist to      10:00:      [id number]  



   leave home      00        

 

 Safety  cannot be  Safety  Active  2019      [facility]  



   left alone      1-12      Ingrahm  



         10:30:      [id number]  



         00        

 

 Safety  can be left  Safety  Active  2019      [facility]  



   alone for      1-12      Ingrahm  



   only short      10:30:      [id number]  



   periods      00        







Allergies, Adverse Reactions, Alerts







 Allergy Name  Allergy  Status  Severity  Reaction(s)  Onset  Inactive  
Treating  Comments



   Type        Date  Date  Clinician  

 

 simvastatin  Base  Active  Unknown  Reaction  2019    Jael Beam  



   Ingredient      Unknown  0-15      







Medications







 Ordered  Filled  Start  Stop  Current  Ordering  Indication  Dosage  Frequency
  Signature  Comments  Components



 Medication  Medication  Date  Date  Medication?  Clinician        (SIG)    



 Name  Name                    

 

 Klor-Con  Klor-Con  2019    Yes  Silcoff    Unknown  Unknown      



 M20 mEq  M20 mEq  0-16      Cholo TORRES            



 tablet,exte  tablet,exte                    



 nded  nded                    



 release  release                    

 

 Tums 200 mg  Tums 200 mg  2019    Yes  Silcoff    Unknown  Unknown      



 calcium  calcium  0-16      Cholo TORRES            



 (500 mg)  (500 mg)                    



 chewable  chewable                    



 tablet  tablet                    

 

 Acetaminoph  Acetaminoph  2019    Yes  Silcoff    Unknown  Unknown      



 en Pain  en Pain  0-16      Cholo TORRES            



 Relief 500  Relief 500                    



 mg tablet  mg tablet                    

 

 oxyCODONE-a  oxyCODONE-a  2019    Yes  Silcoff    Unknown  Unknown      



 cetaminophe  cetaminophe  0-16      Cholo TORRES            



 n 5 mg-325  n 5 mg-325                    



 mg tablet  mg tablet                    

 

 furosemide  furosemide  2019    Yes  Silcoff    Unknown  Unknown      



 20 mg  20 mg  0-16      Cholo TORRES            



 tablet  tablet                    

 

 metoprolol  metoprolol  2019    Yes  Silcoff    Unknown  Unknown      



 succinate  succinate  0-16      Cholo TORRES            



 ER 25 mg  ER 25 mg                    



 tablet,exte  tablet,exte                    



 nded  nded                    



 release 24  release 24                    



 hr  hr                    

 

 hydroxychlo  hydroxychlo  2019    Yes  Silcoff    Unknown  Unknown      



 roquine 200  roquine 200  0-16      MD,Cholo            



 mg tablet  mg tablet                    

 

 omeprazole  omeprazole  2019    Yes  Silcoff    Unknown  Unknown      



 20 mg  20 mg  0-16      MD,Cholo            



 capsule,del  capsule,del                    



 ayed  ayed                    



 release  release                    

 

 Aspirin Low  Aspirin Low  2019    Yes  Silcoff    Unknown  Unknown      



 Dose 81 mg  Dose 81 mg  0-16      MD,Cholo            



 tablet,dina  tablet,dina                    



 yed release  yed release                    

 

 atorvastati  atorvastati  2019    Yes  Silcoff    Unknown  Unknown      



 n 10 mg  n 10 mg  0-16      MD,Cholo            



 tablet  tablet                    







Vital Signs







 Vital Name  Observation Time  Observation Value  Comments

 

 SYSTOLIC mm[Hg]  2019 18:09:09  120 mm[Hg] mm[Hg]  Method: Sit

 

 SYSTOLIC mm[Hg]  2019-10-16 18:08:22  128 mm[Hg] mm[Hg]  Method: Stand

 

 DIASTOLIC mm[Hg]  2019 18:09:09  68 mm[Hg] mm[Hg]  Method: Sit

 

 DIASTOLIC mm[Hg]  2019-10-16 18:08:22  70 mm[Hg] mm[Hg]  Method: Stand

 

 PULSE  2019 18:09:09  66 /min /min  

 

 RESP RATE  2019 18:09:09  16 /min /min  

 

 TEMP  2019 18:09:09  97.4 [degF]  







Procedures

This patient has no known procedures.



Results

This patient has no known results.

## 2019-12-30 NOTE — ED
Shortness of Breath





- HPI Summary


HPI Summary: 





This pt is an 86 y/o male presenting to [facility] via EMS from PCP's office for 

SOB. Pt states about 1 week ago he began feeling weak and had decreased 

appetite. He reports 4-5 days ago he had difficulty getting into his wheelchair 

from the toilet and had to slide down to the floor. Denies hitting his head or 

any other body parts. He notes he then began wheezing and coughing. He reports 

he has been having chest pain that goes across his chest and radiates down his 

right arm for the past 1 week, which has resolved now. Denies swelling in legs. 


Pt went to see his PCP today and was referred to the ED. 





- History of Current Complaint


Chief Complaint: EDShortnessOfBreath


Time Seen by Provider: 12/30/19 16:21


Hx Obtained From: Patient


Onset/Duration: Lasting Days, Still Present


Timing: Constant


Current Severity: Moderate


Dyspnea At: Rest


Aggravating Factors: Nothing


Alleviating Factors: Nothing


Associated Signs & Symptoms: Cough (Nonproductive), Wheezing, Chest Pain w/Cough





- Allergy/Home Medications


Allergies/Adverse Reactions: 


 Allergies











Allergy/AdvReac Type Severity Reaction Status Date / Time


 


simvastatin Allergy Unknown Unknown Verified 12/30/19 21:42





   Reaction  





   Details  














PMH/Surg Hx/FS Hx/Imm Hx


Endocrine/Hematology History: 


   Denies: Hx Diabetes, Hx Thyroid Disease


Cardiovascular History: Reports: Hx Auto Implanted Cardiovert Defib, Hx 

Congestive Heart Failure, Hx Coronary Artery Disease, Hx Hypercholesterolemia, 

Hx Hypertension, Hx Pacemaker/ICD


Respiratory History: 


   Denies: Hx Asthma, Hx Chronic Obstructive Pulmonary Disease (COPD)


GI History: Reports: Hx Gastroesophageal Reflux Disease


   Denies: Hx Cirrhosis, Hx Ulcer


 History: Reports: Hx Renal Disease - abnormal, Other  Problems/Disorders - 

CKD3


   Denies: Hx Dialysis


Musculoskeletal History: Reports: Hx Arthritis, Hx Back Problems - spinal 

stenosis, Other Musculoskeletal History - "knees won't hold me up"


Sensory History: Reports: Hx Cataracts, Hx Contacts or Glasses - reading glasses


   Denies: Hx Hearing Aid


Opthamlomology History: Reports: Hx Cataracts, Hx Contacts or Glasses - reading 

glasses


Neurological History: 


   Denies: Hx Developmental Delay


Psychiatric History: 


   Denies: Hx Panic Disorder





- Surgical History


Surgery Procedure, Year, and Place: HERNIA SURGERY 1965-CATARACTS BILATERAL 

EYES 2011,cardiac bypass, tonsillectomy, status post pacemaker


Infectious Disease History: No


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


   Denies: Hx Hepatitis, Hx Human Immunodeficiency Virus (HIV), Traveled 

Outside the [place] in Last 30 Days





- Family History


Known Family History: 


   Negative: Cardiac Disease





- Social History


Alcohol Use: None


Hx Substance Use: No


Substance Use Type: Reports: None


Hx Tobacco Use: No


Smoking Status (MU): Never Smoked Tobacco


Have You Smoked in the Last Year: No





Review of Systems


Negative: Fever


Positive: Chest Pain


Positive: Shortness Of Breath, Cough


Negative: Edema


Positive: Weakness


All Other Systems Reviewed And Are Negative: Yes





Physical Exam





- Summary


Physical Exam Summary: 





Appearance: The patient is well-nourished in no acute distress and in no acute 

pain.


Skin: The skin is warm and dry and skin color reflects adequate perfusion.


HEENT: The head is normocephalic and atraumatic. The pupils are equal and 

reactive. The conjunctivae are clear and without drainage. Nares are patent and 

without drainage. Mouth reveals moist mucous membranes and the throat is 

without erythema and exudate. The external ears are intact. The ear canals are 

patent and without drainage. The tympanic membranes are intact.


Neck: the neck is supple with full range of motion and non-tender. There are no 

carotid bruits. There is no neck vein distension.


Respiratory: Chest is non-tender. A little bit of crackles at the bases 

bilaterally. 


Cardiovascular: Heart is regular rate and rhythm. There is a loud systolic 

ejection murmur. There is no rub auscultated. There is no peripheral edema and 

pulses are symmetrical and equal.


Abdomen: The abdomen is soft and non-tender. There are normal bowel sounds 

heard in all four quadrants and there is no organomegaly palpated.


Musculoskeletal: There is no back tenderness noted. Extremities are non-tender 

with full range of motion. There is good capillary refill. There is no 

peripheral edema or calf tenderness elicited.


Neurological: Patient is alert and oriented to person, place and time. 


Psychiatric: The patient has an appropriate affect and does not exhibit any 

anxiety or depression.


Triage Information Reviewed: Yes


Vital Signs On Initial Exam: 


 Initial Vitals











Temp Pulse Resp BP Pulse Ox


 


 97.8 F   80   14   125/52   98 


 


 12/30/19 16:19  12/30/19 16:19  12/30/19 16:19  12/30/19 16:19  12/30/19 16:19











Vital Signs Reviewed: Yes





Procedures





- Sedation


Patient Received Moderate/Deep Sedation with Procedure: No





Diagnostics





- Vital Signs


 Vital Signs











  Temp Pulse Resp BP Pulse Ox


 


 12/30/19 16:19  97.8 F  80  14  125/52  98














- Laboratory


Result Diagrams: 


 12/30/19 17:10





 12/30/19 17:09


Lab Statement: Any lab studies that have been ordered have been reviewed, and 

results considered in the medical decision making process.





- Radiology


  ** Chest XR


Radiology Interpretation Completed By: Radiologist


Summary of Radiographic Findings: IMPRESSION: Findings most consistent with 

congestive heart failure. Dr. Simpson has reviewed this report.





- CT


  ** CT abdomen/pelvis


CT Interpretation Completed By: Radiologist


Summary of CT Findings: IMPRESSION: 1. No acute findings. No hydronephrosis or 

radiopaque renal calculi. 2. Suprapubic catheter now present. 3. New bilateral 

pleural effusions, right larger than left. 4. Layering sludge or tiny stones in 

the gallbladder. No pericholecystic stranding. 5. Other nonemergent findings 

detailed above. Dr. Simpson has reviewed this report.





- EKG


  ** 16:56


Cardiac Rate: NL - at 73 bpm


Summary of EKG Findings: EKG at 1656 shows paced rhythm at a rate of 73 bpm.





Course/Dx





- Course


Course Of Treatment: Mr. Vazquez presented with mild CHF and elevated troponin.  

He was complaining of shortness of breath and chest pain although he was a bit 

better after his ambulance ride in.  He was kept on the monitor while labs were 

obtained as well as chest x-ray and EKG.  I spoke with the hospitalist about 

admission and stabilization.





- Diagnoses


Provider Diagnoses: 


 Elevated troponin, Acute on chronic systolic (congestive) heart failure








- Physician Notifications


Discussed Care of Patient With: Anita Bassett


Time Discussed With Above Provider: 18:06


Instructed by Provider To: Other - Discussed case with Dr. Bassett, hospitalist, 

recommends CT abdomen/pelvis.





- Critical Care Time


Critical Care Time: 30-74 min





Discharge ED





- Sign-Out/Discharge


Documenting (check all that apply): Patient Departure - [place] [facility]





- Discharge Plan


Condition: Stable


Disposition: ADMITTED TO [facility]


Referrals: 


Cholo Boo MD [Primary Care Provider] - 





- Billing Disposition and Condition


Condition: STABLE


Disposition: Admitted to [facility]





- Attestation Statements


Document Initiated by Scribe: Yes


Documenting Scribe: Ting Goldman


Provider For Whom Scribe is Documenting (Include Credential): Chris Simpson MD


Scribe Attestation: 


I, Ting Goldman, scribed for Chris Simpson MD on 12/30/19 at 2146. 


Scribe Documentation Reviewed: Yes


Provider Attestation: 


The documentation as recorded by the sarahibeTing accurately reflects 

the service I personally performed and the decisions made by me, Chris Simpson MD


Status of Scribe Document: Viewed

## 2019-12-30 NOTE — HP
CC:  Dr. Boo; Dr. Faria; Dr. Castro*

 

HISTORY AND PHYSICAL:

 

DATE OF ADMISSION:  19

 

PROVIDER:  Bobby Van NP

 

TIME OF EVALUATION:  

 

PRIMARY CARE PROVIDER:  Dr. Boo.

 

PRIMARY CARDIOLOGIST:  Dr. Faria.

 

ATTENDING PHYSICIAN WHILE IN THE HOSPITAL:  Dr. Eze Baltazar* (dictated by Bobby Van NP).

 

CHIEF COMPLAINT:  Shortness of breath, bilateral arm pain.

 

HISTORY OF PRESENT ILLNESS:  Mr. Vazquez is an 87-year-old male with a past 
medical history significant for a systolic congestive heart failure, history of 
non-ST elevation MI, coronary artery disease status post CABG, interstitial 
lung disease, undetermined connective tissue disorder, hypertension, 
hyperlipidemia, chronic kidney disease stage 3, atrial fibrillation, status 
post pacemaker placement, who presented to the emergency room with complaints 
of shortness of breath and bilateral arm pain x4 days.

 

The patient was recently admitted here from 19 to 19, at that time, 
he was diagnosed with hypoglycemia, acute renal failure superimposed on chronic 
kidney disease, acute urinary retention due to obstructive uropathy at which 
time during that hospitalization, he had a suprapubic catheter placed, 
hyperkalemia, acute on chronic systolic heart failure, macrocytic anemia 
superimposed on anemia of chronic disease.  During that hospitalization, the 
patient did receive a suprapubic catheter and he did initially have an elevated 
creatinine level which did trend down during his hospitalization.

 

The patient reports that approximately 4 days ago, he developed pressure in his 
chest and pain radiating down bilateral arms, which has been consistent for the 
past 4 days.  He does describe the pain in his bilateral arms is like a 
toothache.  He does report that when he got in the ambulance that his chest 
pressure and arm pain subsided.The patient also reports worsening shortness of 
breath and wheezing.   Decreased appetite as well as increased weakness over 
the past 4 days as well.  He does report occasional dizzy spells for the past 2 
days. Due to these symptoms, the patient presented to the emergency room for 
further evaluation.  The patient denies any fever, chills, unintended weight 
loss.  He did report some chest pressure with radiation to bilateral arms.  No 
worsening of edema.  He does have chronic lower extremity edema.  He denies any 
cough, hemoptysis.  He does report shortness of breath and wheezing that is 
progressively worse x4 days.  No nausea, vomiting, diarrhea, abdominal pain, 
gross hematuria, or dysuria.  He denies any blood in his Segura catheter, 
suprapubic catheter drainage bag.  He complains of generalized weakness.  No 
visual complaints, dysphagia, arthralgias, myalgias, rashes, lesions, open sores
, psychosis, or anxiety.

 

While in the emergency room, the patient had routine lab work drawn.  He was 
found to have an elevated creatinine of 3.72, which is above his baseline.  He 
was found to have an elevated troponin.  His initial troponin was 0.38, repeat 
was 0.42 and elevated BNP of greater than 1300 and an elevated lactic acid of 
2.8.  He was also found to be hyperkalemic with a potassium level of 5.9.  His 
bicarb was 17.  Due to these findings, Hospital Medicine was asked to see and 
evaluate the patient for admission.

 

PAST MEDICAL HISTORY:  Significant for:

1.  Systolic congestive heart failure with reduced ejection fraction.

2.  Coronary artery disease.

3.  History of non-ST MI.

4.  Interstitial lung disease.

5.  Hypertension.

6.  Hyperlipidemia.

7.  Connective tissue disorder.

8.  Chronic kidney disease stage 3.

9.  History of atrial fibrillation, not on anticoagulation, status post 
pacemaker placement.

 

PAST SURGICAL HISTORY:

1.  Pacemaker.

2.  Hernia repairs.

3.  Suprapubic catheter placement.

4.  Tonsillectomy.

5.  CABG.

 

HOME MEDICATIONS:  Include:

1.  Oxycodone/acetaminophen 5/325 one tablet p.o. q.6 hours p.r.n.

2.  Metamucil 1 pack p.o. daily.

3.  Omeprazole 20 mg p.o. daily.

4.  Multivitamin 1 cap p.o. daily.

5.  Metoprolol succinate 25 mg p.o. b.i.d.

6.  Plaquenil 200 mg p.o. daily.

7.  Furosemide 60 mg p.o. q.a.m.

8.  Folic acid 1 mg p.o. daily.

9.  Vitamin B12 500 mcg p.o. daily.

10.  Vitamin D 1000 units p.o. daily.

11.  Calcium carbonate 500 mg p.o. b.i.d.

12.  Atorvastatin 10 mg p.o. daily.

13.  Aspirin 81 mg p.o. daily.

14.  Acetaminophen 500 mg p.o. b.i.d. p.r.n. pain.

 

ALLERGIES:  No known drug allergies.

 

FAMILY HISTORY:  Mother  in her 70s of unknown cause, did have a history of 
kidney disease.  No reported history of diabetes or cancer within the family.

 

SOCIAL HISTORY:  The patient denies any smoking, alcohol, or illicit drug use.  
He currently resides in his home with his wife.  Surrogate decision maker in 
the event he is unable to make his own decisions is his wife.  He is a full 
code.

 

REVIEW OF SYSTEMS:  A 14-point review of systems was completed, all pertinent 
positives are mentioned in the HPI.

 

                               PHYSICAL EXAMINATION

 

GENERAL:  At this time, Mr. Vazquez is an 87-year-old male.  He is alert and 
oriented, resting on the stretcher in the emergency room.  He is in no acute 
distress.

 

VITAL SIGNS:  Blood pressure 123/48, heart rate 64, paced rhythm on the monitor
, respirations are 17, O2 saturation 96% on room air, temperature was 97.8.

 

HEENT:  Head is atraumatic, normocephalic.  Eyes:  EOMs are intact.  Sclerae 
anicteric and not pale.  Oral mucosa are moist.

 

NECK:  Supple.

 

LUNGS:  Diminished bilaterally with crackles in the bases.

 

CARDIAC:  S1, S2.  Regular rate and rhythm.  He does have a systolic murmur.  
No rubs or gallops.

 

ABDOMEN:  Soft and nontender.  Bowel sounds are present x4.

 

EXTREMITIES:  He is able to move all 4 extremities.  There is no clubbing or 
cyanosis.  Radial pulses are +2 bilaterally.  Pedal pulses are +1 bilaterally.  
He does have 2 to 3+ pitting edema noted to his lower legs and feet.

 

NEUROLOGIC:  He is awake, alert, oriented x3.  Speech is clear.  Thought 
process is intact.  There are no gross focal deficits.

 

 DIAGNOSTIC STUDIES/LAB DATA:  WBCs are 8.9, RBCs 2.39, hemoglobin 7.9, 
hematocrit is 24, platelet count is 266.  INR is 1.22.  Sodium 131, potassium 
5.9, chloride 103, carbon dioxide was 17, anion gap was 11, BUN was 59, 
creatinine is 3.72, glucose was 126, lactic acid was 2.8, calcium 8.6.  ASTs 
are 44, ALTs are 51, alkaline phosphatase is 336.  Troponin is 0.38, repeat was 
0.42.  BNP was greater than 1300.  Urine color was yellow, pH was 5.0, specific 
gravity 1.011, urine protein was 2+, urine ketones were negative, urine blood 
was 1+.  Urine nitrites, bilirubin, urobilinogen were all negative.  Urine 
leukocyte esterase was 1+, wbc's were 2+, rbc's were 2+.  Urine bacteria was 
absent.  Hyaline casts were present.

 

He had an electrocardiogram, which showed atrial sensed ventricular paced 
rhythm at a rate of 73.  No acute ST elevations.

 

He had a chest x-ray, radiologist's impression:  Findings consistent with 
congestive heart failure.

 

He had a CT of the abdomen and pelvis, radiologist's impression:  No acute 
findings.  No hydronephrosis or radiopaque renal calculi.  Suprapubic catheter 
is present.  Now present new bilateral pleural effusions, right larger than left
, layering sludge and tiny stones in the gallbladder.  No pericholecystic 
stranding, small hiatal hernia.  The bladder is collapsed around the suprapubic 
catheter.

 

ASSESSMENT AND PLAN:  Mr. Vazquez is an 87-year-old male with a past medical 
history significant for systolic congestive heart failure, history of non-ST 
elevation myocardial infarction; coronary artery disease, status post coronary 
artery bypass grafting; interstitial lung disease; connective tissue disorder; 
hypertension; hyperlipidemia; chronic kidney disease; history of atrial 
fibrillation, status post pacemaker, who presented to the emergency room with a 
complaints of shortness of breath, was found to have congestive heart failure 
with acute kidney injury and an elevated troponin.  He will be admitted 
inpatient for:

 

1.  Shortness of breath.  I suspect his shortness of breath is related to acute 
on chronic systolic congestive heart failure.  The patient does have a known 
reduced ejection fraction of 30% to 35%.  I will give him a dose of Lasix 60 mg 
IV.  I will place him on strict I's and O's.  We will monitor him on telemetry, 
follow daily weights.  May need to consider repeat transthoracic 
echocardiogram. 

2.  Acute kidney injury, superimposed on chronic kidney disease stage 3.  I 
suspect that this could be related to vascular congestion from underlying 
congestive heart failure.  He will be given a dose of Lasix 60 mg.  We will 
monitor his BMP.  We will avoid nephrotoxic medication.  He did have a CT of 
the abdomen and pelvis, did not show any acute hydronephrosis or obstructing 
renal calculi.  If his renal function does not improve, I would recommend a 
consultation to Nephrology for further recommendations.

3.  Hyperkalemia.  The patient does have a potassium of 5.9.  He will be 
receiving a dose of Lasix.  We will repeat a BMP in the AM Will give him one 
dose of Kayexalate tonight.

4.  Hypertension.  The patient's blood pressure is normotensive.  I will 
continue his metoprolol as previously prescribed.

5.  Elevated troponin.  The patient does have an elevated troponin, it is noted 
that the patient has a chronic elevated troponin likely related to demand 
ischemia from underlying systolic congestive heart failure exacerbation and 
reduced ejection fraction.  The patient will be given Lasix for his fluid 
overload.  Also within the differential could be Non ST elevation MI as the 
patient does report a history of 4 days of chest pressure radiating down 
bilateral arms with associated shortness of breath.  The patient will be 
continued on metoprolol, atorvastatin and aspirin as previously prescribed.  
Will give Aspirin 324 mg now.  I am going to hold off on starting a heparin 
drip at this time as it appears to be related to demand ischemia at this time. 
I trend his troponins if they continue to trend upward, I would recommend a 
consultation to Cardiology and consider starting a heparin drip.

6.  Anemia.  The patient does have a chronic anemia.  He did have iron studies 
on his previous admission on 19 to 19, his iron level was low at 
that time.  Likely related to chronic disease.  He is at baseline at this time.
  We will continue to monitor his H and H.

7.  Hyponatremia.  The patient is mildly hyponatremic.  I suspect this is 
related to volume overload.  He will receive a dose of Lasix and a repeat BMP 
in the a.m. We will continue to monitor his sodium level.

8.  FEN:  He can have a heart-healthy, decaf okay diet.

9.  Code status:  He is a full code.

10.  DVT prophylaxis:  I will place him on heparin subcu.

 

TIME SPENT:  Time spent on this admission was 60 minutes, greater than half 
that time was spent at the bedside reviewing events leading thus far to his 
hospitalization, performing physical exam, and reviewing my plan of care.

 

I have discussed this with my attending, Dr. Eze Baltazar; he is in agreement with 
my plan.

 

 ____________________________________ 

BOBBY VAN, NP

 

[id number]/CPS #: [id number]

MARIO

## 2019-12-30 NOTE — XMS REPORT
Patient Summary Document

 Created on:2019



Patient:OBDULIA MONTELONGO JR Unknown

Sex:Male

:1932

External Reference #:[id number]





Demographics







 Address  [address] [place] [address]

 

 Home Phone  [phone]

 

 Preferred Language  English

 

 Marital Status  Unknown

 

 Mosque Affiliation  Unknown

 

 Race  Unknown

 

 Ethnic Group  Unknown









Author







 Organization  [facility]









Support







 Name  Relationship  Address  Phone

 

 Unknown Name, Unknown Name  NextOfKin  Unknown Address  Unavailable









Care Team Providers







 Name  Role  Phone

 

 Unavailable  Unavailable  Unavailable









Problems







 Condition  Condition  Condition  Status  Onset  Resolution  Last  Treating  
Comments



 Name  Details  Category    Date  Date  Treatment  Clinician  



             Date    

 

 Hypertensiv  Hypertensiv  Diagnosis  Active        Angelic  



 e heart and  e heart and            Ingrahm  



 chronic  chronic            [id number]  



 kidney  kidney              



 disease  disease              



 with heart  with heart              



 failure and  failure and              



 stage 1  stage 1              



 through  through              



 stage 4  stage 4              



 chronic  chronic              



 kidney  kidney              



 disease, or  disease, or              



 unspecified  unspecified              



 chronic  chronic              



 kidney  kidney              



 disease  disease              

 

 Chronic  Chronic  Diagnosis  Active        Nagelic  



 diastolic  diastolic            Ingrm  



 (congestive  (congestive            [id number]  



 ) heart  ) heart              



 failure  failure              

 

 Chronic  Chronic  Diagnosis  Active        Angelic  



 [facility]  kid[facility]            [facility]  



 disease,  disease,            [id number]  



 stage 3  stage 3              



 (moderate)  (moderate)              

 

 Encounter  Encounter  Diagnosis  Active  2019      Angelic  



 for fitting  for fitting      0-16      [facility]  



 and  and            [id number]  



 adjustment  adjustment              



 of urinary  of urinary              



 device  device              

 

 Sick sinus  Sick sinus  Diagnosis  Active        Angelic  



 syndrome  syndrome            [facility]  



               [id number]  

 

 Atheroscler  Atheroscler  Diagnosis  Active        Angelic  



 otic heart  otic heart            Ingrm  



 disease of  disease of            [id number]  



 native  native              



 coronary  coronary              



 artery  artery              



 without  without              



 angina  angina              



 pectoris  pectoris              

 

 Bilateral  Bilateral  Diagnosis  Active        Angelic  



 primary  primary            Ingrahm  



 osteoarthri  osteoarthri            [id number]  



 tis of knee  tis of knee              

 

 Other  Other  Diagnosis  Active        Angelic  



 interverteb  interverteb            Ingrm  



 ral disc  ral disc            [id number]  



 degeneratio  degeneratio              



 n, lumbar  n, lumbar              



 region  region              

 

 Other  Other  Diagnosis  Active        Angelic  



 malaise  malaise            Ingrahm  



               [id number]  

 

 Old  Old  Diagnosis  Active        Angelic  



 myocardial  myocardial            Ingrahm  



 infarction  infarction            [id number]  

 

 Long term  Long term  Diagnosis  Active        Angelic  



 (current)  (current)            Ingrm  



 use of  use of            [id number]  



 aspirin  aspirin              

 

 Long term  Long term  Diagnosis  Active        Angelic  



 (current)  (current)            Ingrahm  



 use of  use of            [id number]  



 opiate  opiate              



 analgesic  analgesic              

 

 Presence of  Presence of  Diagnosis  Active        Angelic  



 aortocorona  aortocorona            Ingrahm  



 ry bypass  ry bypass            [id number]  



 graft  graft              

 

 Presence of  Presence of  Diagnosis  Active        Angelic  



 cardiac  cardiac            Ingrahm  



 pacemaker  pacemaker            [id number]  

 

 Personal  Personal  Diagnosis  Active        Angelic  



 history of  history of            Ingrahm  



 pneumonia  pneumonia            [id number]  



 (recurrent)  (recurrent)              

 

 Pain  frequent  Pain Mgmt  Resolve  2019    [place]  



   pain    d  0-16  11:00:00    [place]  



         10:00:      [id number]  



         00        

 

 Respiratory  lung sounds  Respirator  Resolve  2019    [place]  



   deficit  y  d  0-16  11:00:00    [place]  



         10:00:      [id number]  



         00        

 

 Respiratory  dyspnea  Respirator  Active  2019      [place]  



   present  y    0-16      [place]  



         10:00:      [id number]  



         00        

 

 Endo/Hema  anti-coagul  Endo/Hema  Resolve  2019    [place]  



   ation    d  0-16  11:00:00    [place]  



   therapy      10:00:      [id number]  



         00        

 

 Sensory  impaired  Sensory  Active  2019      [place]  



   hearing      0-16      [place]  



         10:00:      [id number]  



         00        

 

 Integument  skin  Integument  Resolve  2019    [place]  



   integrity    d  0-16  11:00:00    [place]  



   risk      10:00:      [id number]  



         00        

 

 Nutrition  nutritional  Nutrition  Resolve  2019    [place]  



   restriction    d  0-16  11:00:00    [place]  



   s      10:00:      [id number]  



         00        

 

 Elimination  catheter  Eliminatio  Resolve  2019    [place]  



   present  n  d  0-16  11:00:00    [place]  



         10:00:      [id number]  



         00        

 

 Neuro  confusion  Neuro/Emot  Active  2019      [place]  



   present  ion    0-16      [place]  



         10:00:      [id number]  



         00        

 

 Neuro  impaired  Neuro/Emot  Active  2019      [place]  



   decision-ma  ion    0-16      [place]  



   evelin      10:00:      [id number]  



         00        

 

 Activity  ADL  Activity  Active  2019      [place]  



   assistance      0-16      [place]  



   required      10:00:      [id number]  



         00        

 

 Activity  self-care  Activity  Resolve  2019    [place]  



   deficit    d  0-16  11:00:00    [place]  



         10:00:      [id number]  



         00        

 

 Safety  fall risk  Safety  Active  2019      [place]  



   factor      0-16      [place]  



   present      10:00:      [id number]  



         00        

 

 Safety  risk for  Safety  Active  2019      [place]  



   hospitaliza      0-16      [place]  



   tion      10:00:      [id number]  



         00        

 

 Medication  oral med  Meds  Active  2019      [place]  



   assistance      0-16      [place]  



   required      10:00:      [id number]  



         00        

 

 Musculoskel  transfer  Musculoske  Resolve  2019    [place]  



 etal  assistance  letal  d  0-16  11:00:00    [place]  



   required      10:00:      [id number]  



         00        

 

 Musculoskel  requires  Musculoske  Resolve  2019    [place]  



 etal  human  letal  d  0-16  11:00:00    [place]  



   assist to      10:00:      [id number]  



   leave home      00        

 

 Safety  cannot be  Safety  Active  2019      Angelic  



   left alone      1-12      Ingrahm  



         10:30:      [id number]  



         00        

 

 Safety  can be left  Safety  Active  2019      Angelic  



   alone for      1-12      Ingrahm  



   only short      10:30:      [id number]  



   periods      00        







Allergies, Adverse Reactions, Alerts







 Allergy Name  Allergy  Status  Severity  Reaction(s)  Onset  Inactive  
Treating  Comments



   Type        Date  Date  Clinician  

 

 simvastatin  Base  Active  Unknown  Reaction  2019    Jael Beam  



   Ingredient      Unknown  0-15      







Medications







 Ordered  Filled  Start  Stop  Current  Ordering  Indication  Dosage  Frequency
  Signature  Comments  Components



 Medication  Medication  Date  Date  Medication?  Clinician        (SIG)    



 Name  Name                    

 

 Klor-Con  Klor-Con  2019    Yes  Silcoff    Unknown  Unknown      



 M20 mEq  M20 mEq  0-16      Cholo TORRES            



 tablet,exte  tablet,exte                    



 nded  nded                    



 release  release                    

 

 Tums 200 mg  Tums 200 mg  2019    Yes  Silcoff    Unknown  Unknown      



 calcium  calcium  0-16      Cholo TORRES            



 (500 mg)  (500 mg)                    



 chewable  chewable                    



 tablet  tablet                    

 

 Acetaminoph  Acetaminoph  2019    Yes  Silcoff    Unknown  Unknown      



 en Pain  en Pain  0-16      Cholo TORRES            



 Relief 500  Relief 500                    



 mg tablet  mg tablet                    

 

 oxyCODONE-a  oxyCODONE-a  2019    Yes  Silcoff    Unknown  Unknown      



 cetaminophe  cetaminophe  0-16      Cholo TORRES            



 n 5 mg-325  n 5 mg-325                    



 mg tablet  mg tablet                    

 

 furosemide  furosemide  2019    Yes  Silcoff    Unknown  Unknown      



 20 mg  20 mg  0-16      Cholo TORRES            



 tablet  tablet                    

 

 metoprolol  metoprolol  2019    Yes  Silcoff    Unknown  Unknown      



 succinate  succinate  0-16      Cholo TORRES            



 ER 25 mg  ER 25 mg                    



 tablet,exte  tablet,exte                    



 nded  nded                    



 release 24  release 24                    



 hr  hr                    

 

 hydroxychlo  hydroxychlo  2019    Yes  Silcoff    Unknown  Unknown      



 roquine 200  roquine 200  0-16      MD,Cholo            



 mg tablet  mg tablet                    

 

 omeprazole  omeprazole  2019    Yes  Silcoff    Unknown  Unknown      



 20 mg  20 mg  0-16      MD,Cholo            



 capsule,del  capsule,del                    



 ayed  ayed                    



 release  release                    

 

 Aspirin Low  Aspirin Low  2019    Yes  Silcoff    Unknown  Unknown      



 Dose 81 mg  Dose 81 mg  0-16      MD,Cholo            



 tablet,dina  tablet,dina                    



 yed release  yed release                    

 

 atorvastati  atorvastati  2019    Yes  Silcoff    Unknown  Unknown      



 n 10 mg  n 10 mg  0-16      MD,Cholo            



 tablet  tablet                    







Vital Signs







 Vital Name  Observation Time  Observation Value  Comments

 

 SYSTOLIC mm[Hg]  2019 18:09:09  120 mm[Hg] mm[Hg]  Method: Sit

 

 SYSTOLIC mm[Hg]  2019-10-16 18:08:22  128 mm[Hg] mm[Hg]  Method: Stand

 

 DIASTOLIC mm[Hg]  2019 18:09:09  68 mm[Hg] mm[Hg]  Method: Sit

 

 DIASTOLIC mm[Hg]  2019-10-16 18:08:22  70 mm[Hg] mm[Hg]  Method: Stand

 

 PULSE  2019 18:09:09  66 /min /min  

 

 RESP RATE  2019 18:09:09  16 /min /min  

 

 TEMP  2019 18:09:09  97.4 [degF]  







Procedures

This patient has no known procedures.



Results

This patient has no known results.

## 2019-12-31 LAB
ANION GAP SERPL CALC-SCNC: 11 MMOL/L (ref 2–11)
BASOPHILS # BLD AUTO: 0.1 10^3/UL (ref 0–0.2)
BUN SERPL-MCNC: 58 MG/DL (ref 6–24)
BUN/CREAT SERPL: 17 (ref 8–20)
CALCIUM SERPL-MCNC: 8.2 MG/DL (ref 8.6–10.3)
CHLORIDE SERPL-SCNC: 106 MMOL/L (ref 101–111)
EOSINOPHIL # BLD AUTO: 0.1 10^3/UL (ref 0–0.6)
GLUCOSE SERPL-MCNC: 90 MG/DL (ref 70–100)
HCO3 SERPL-SCNC: 16 MMOL/L (ref 22–32)
HCT VFR BLD AUTO: 22 % (ref 42–52)
HGB BLD-MCNC: 7.1 G/DL (ref 14–18)
LYMPHOCYTES # BLD AUTO: 1.3 10^3/UL (ref 1–4.8)
MAGNESIUM SERPL-MCNC: 2.6 MG/DL (ref 1.9–2.7)
MCH RBC QN AUTO: 33 PG (ref 27–31)
MCHC RBC AUTO-ENTMCNC: 33 G/DL (ref 31–36)
MCV RBC AUTO: 102 FL (ref 80–94)
MONOCYTES # BLD AUTO: 1 10^3/UL (ref 0–0.8)
NEUTROPHILS # BLD AUTO: 6.1 10^3/UL (ref 1.5–7.7)
NRBC # BLD AUTO: 0 10^3/UL
NRBC BLD QL AUTO: 0.1
PLATELET # BLD AUTO: 250 10^3/UL (ref 150–450)
POTASSIUM SERPL-SCNC: 5 MMOL/L (ref 3.5–5)
RBC # BLD AUTO: 2.14 10^6 /UL (ref 4.18–5.48)
SODIUM SERPL-SCNC: 133 MMOL/L (ref 135–145)
TROPONIN I SERPL-MCNC: 0.43 NG/ML (ref ?–0.03)
TROPONIN I SERPL-MCNC: 0.61 NG/ML (ref ?–0.03)
TROPONIN I SERPL-MCNC: 0.61 NG/ML (ref ?–0.03)
WBC # BLD AUTO: 8.5 10^3/UL (ref 3.5–10.8)

## 2019-12-31 PROCEDURE — 30233N1 TRANSFUSION OF NONAUTOLOGOUS RED BLOOD CELLS INTO PERIPHERAL VEIN, PERCUTANEOUS APPROACH: ICD-10-PCS | Performed by: INTERNAL MEDICINE

## 2019-12-31 RX ADMIN — Medication SCH UNITS: at 08:47

## 2019-12-31 RX ADMIN — HYDROXYCHLOROQUINE SULFATE SCH MG: 200 TABLET, FILM COATED ORAL at 08:48

## 2019-12-31 RX ADMIN — FOLIC ACID SCH MG: 1 TABLET ORAL at 08:47

## 2019-12-31 RX ADMIN — IPRATROPIUM BROMIDE AND ALBUTEROL SULFATE PRN NEB: .5; 3 SOLUTION RESPIRATORY (INHALATION) at 10:40

## 2019-12-31 RX ADMIN — PANTOPRAZOLE SODIUM SCH MG: 40 TABLET, DELAYED RELEASE ORAL at 08:47

## 2019-12-31 RX ADMIN — ASPIRIN SCH MG: 81 TABLET, COATED ORAL at 08:48

## 2019-12-31 RX ADMIN — CYANOCOBALAMIN TAB 500 MCG SCH MCG: 500 TAB at 08:48

## 2019-12-31 RX ADMIN — THERA TABS SCH TAB: TAB at 08:47

## 2019-12-31 NOTE — PN
Subjective


Date of Service: 12/31/19


Interval History: 





Seen sitting up in bed. Denies experiencing chest pain or radiating pain down 

his arms. Denies experiencing shortness of breath at rest though does with 

exertion. Had a florse conversation about his state of health, asked about goals 

of care which he did not have much input about. Seems to lack insight about the 

severity of his comorbidities. Spoke with patient about code status and the low 

probability of resuscitation should his heart stop. He still wanted CPR stating 

that "you can try it for an hour or so". Despite his severe anemia, he appears 

to be tolerating it well/ Denies headaches, dizziness, lightheadedness, nausea, 

vomiting, issue moving bowels or bladder.


Family History: Unchanged from Admission


Social History: Unchanged from Admission


Past Medical History: Unchanged from Admission





Objective


Active Medications: 








Acetaminophen (Tylenol Tab*)  650 mg PO Q4H PRN


   PRN Reason: MILD PAIN or TEMP > 100.4


Albuterol/Ipratropium (Duoneb (Albuterol 2.5 Mg/Ipratropium 0.5 Mg))  1 neb INH 

Q4H PRN


   PRN Reason: SOB/WHEEZING


   Last Admin: 12/31/19 10:40 Dose:  1 neb


Aspirin (Aspirin Ec Tab*)  81 mg PO DAILY [facility]


   Last Admin: 12/31/19 08:48 Dose:  81 mg


Atorvastatin Calcium (Lipitor*)  10 mg PO DAILY [facility]


Calcium Carbonate (Tums*)  500 mg PO Q4H PRN


   PRN Reason: INDIGESTION


   Last Admin: 12/31/19 14:13 Dose:  500 mg


Cholecalciferol (Vitamin D Tab*)  1,000 units PO DAILY [facility]


   Last Admin: 12/31/19 08:47 Dose:  1,000 units


Cyanocobalamin (Vitamin B12 Tab*)  500 mcg PO DAILY [facility]


   Last Admin: 12/31/19 08:48 Dose:  500 mcg


Folic Acid (Folvite Tab*)  1 mg PO DAILY [facility]


   Last Admin: 12/31/19 08:47 Dose:  1 mg


Heparin Sodium (Porcine) (Heparin Vial(*))  0 units IV .PER PROTOCOL [facility]


   Last Admin: 12/31/19 03:53 Dose:  4,000 units


Hydroxychloroquine Sulfate (Plaquenil Tab*)  200 mg PO DAILY [facility]


   Last Admin: 12/31/19 08:48 Dose:  200 mg


Metoprolol Succinate (Toprol Xl Tab*)  25 mg PO BID [facility]


Multivitamins/Minerals (Theragran/Minerals Tab*)  1 tab PO DAILY [facility]


   Last Admin: 12/31/19 08:47 Dose:  1 tab


Oxycodone/Acetaminophen (Percocet 5/325 Tab*)  1 tab PO Q6H PRN


   PRN Reason: PAIN - SEVERE


Pantoprazole Sodium (Protonix Tab*)  40 mg PO DAILY [facility]


   Last Admin: 12/31/19 08:47 Dose:  40 mg








 Vital Signs - 8 hr











  12/31/19 12/31/19 12/31/19





  10:43 11:15 11:30


 


Temperature  98.0 F 97.8 F


 


Pulse Rate 71 74 76


 


Respiratory 16 20 20





Rate   


 


Blood Pressure  122/50 117/53





(mmHg)   


 


O2 Sat by Pulse 100 100 100





Oximetry   














  12/31/19 12/31/19 12/31/19





  14:35 14:58 15:11


 


Temperature 98.0 F 98.2 F 98.3 F


 


Pulse Rate 75 77 70


 


Respiratory 20 18 20





Rate   


 


Blood Pressure 121/51 118/52 112/44





(mmHg)   


 


O2 Sat by Pulse 98 99 99





Oximetry   











Oxygen Devices in Use Now: None


Appearance: This is a well developed older man see sitting up in bed, no acute 

distress.


Eyes: No Scleral Icterus, PERRLA


Ears/Nose/Mouth/Throat: NL Teeth, Lips, Gums, Clear Oropharnyx, Mucous 

Membranes Moist


Neck: NL Appearance and Movements; NL JVP, Trachea Midline


Respiratory: Symmetrical Chest Expansion and Respiratory Effort, - - Expiratory 

wheezes scattered throughout all lung fields. Crankles to left lung base.


Cardiovascular: NL Sounds; No Murmurs; No JVD, - - 1+ pitting edema to 

bilateral lower extremities.


Abdominal: NL Sounds; No Tenderness; No Distention


Lymphatic: No Cervical Adenopathy


Extremities: No Clubbing, Cyanosis, - - 1+ pitting bilateral lower extremity 

edema


Skin: No Rash or Ulcers, No Nodules or Sclerosis


Neurological: Alert and Oriented x 3


Lines/Tubes/Other Access: Clean, Dry and Intact Peripheral IV


Result Diagrams: 


 12/31/19 03:54





 12/31/19 03:54


Microbiology and Other Data: 


 Microbiology











 12/30/19 17:45 Urine Culture - Final





 Urine    No Growth (<1,000 CFU/mL)














Assess/Plan/Problems-Billing


Assessment: 





This is an 87 year old male with a past medical history of NSTEMI, systolic CHF 

and afib with pacer who was admitted 12/30/19 for chest pain radiating to 

bilateral arms and shortness of breath.





- Patient Problems


(1) Elevated troponin


Current Visit: No   Status: Acute   Code(s): R74.8 - ABNORMAL LEVELS OF OTHER 

SERUM ENZYMES   SNOMED Code(s): 063054738


   Comment: - Admitted with chest pain, though is no longer symptomatic. 

Cardiology consulted. Not a candidate for cardiac cath. Stopped heparin drip 

and brillinta.


- Peaked at 0.61. 100% v-paced on EKG.


- Suspect demand ischemia secondary to anemia.    





(2) Anemia


Current Visit: No   Status: Acute   Code(s): D64.9 - ANEMIA, UNSPECIFIED   

SNOMED Code(s): 344700742


   Comment: -HH 7.1/22 this morning. Transfused with two units of blood, given 

a dose of IV furosemide in between units.


-Previous folate and B12 studies on last admission were normal. Has iron 

deficiency anemia which is likely anemia of chronic cardiorenal disease. 

Recommend follow up with nephrology as he may require epogen. 


-Feel that this was causing elevated troponins due to ischemic demand.    





(3) Acute on chronic systolic (congestive) heart failure


Current Visit: No   Status: Acute   Code(s): I50.23 - ACUTE ON CHRONIC SYSTOLIC 

(CONGESTIVE) HEART FAILURE   SNOMED Code(s): 136783813


   Comment: -b/l LE edema, crackles to left lung base.


-I/O, daily weights


-recommend LE elevation


-IV furosemide 40mg daily.   





(4) Acute renal failure superimposed on stage 3 chronic kidney disease


Current Visit: No   Status: Acute   Code(s): N17.9 - ACUTE KIDNEY FAILURE, 

UNSPECIFIED; N18.3 - CHRONIC KIDNEY DISEASE, STAGE 3 (MODERATE)   SNOMED Code(s)

: 952268211


   Comment: -Creatinine elevated above baseline, though improving.


-continue suprapubic catheter for obstructive uropathy


-Recommend nephrology follow up on discharge


-Continue IV furosemide, recheck BMP in AM.   





(5) Afib


Current Visit: No   Status: Acute   Code(s): I48.91 - UNSPECIFIED ATRIAL 

FIBRILLATION   SNOMED Code(s): 51479194


   Comment: -Heparin drip and brillinta stopped per Cardiology. Will hold 

anticoagulants due to severe anemia.


-Continue metoprolol.   





(6) Dyslipidemia


Current Visit: No   Status: Acute   Code(s): E78.5 - HYPERLIPIDEMIA, 

UNSPECIFIED   SNOMED Code(s): 722357501


   Comment: -continue atorvastatin. Increased from 10 to 40mg due to recent MI. 

Will check lipids in AM.   





(7) GERD (gastroesophageal reflux disease)


Current Visit: No   Status: Acute   Code(s): K21.9 - GASTRO-ESOPHAGEAL REFLUX 

DISEASE WITHOUT ESOPHAGITIS   SNOMED Code(s): 933919391


   Comment: -Continue protonix   





(8) CAD (coronary artery disease)


Current Visit: No   Status: Acute   Code(s): I25.10 - ATHSCL HEART DISEASE OF 

NATIVE CORONARY ARTERY W/O ANG PCTRS   SNOMED Code(s): 47334983


   Comment: 


-Continue ASA, lipitor, metoprolol   





(9) DVT prophylaxis


Current Visit: No   Status: Acute   Code(s): ZNR2942 -    SNOMED Code(s): 

790067300


   Comment: -Continue SCD's   





(10) Full code status


Current Visit: No   Status: Acute   Code(s): Z78.9 - OTHER SPECIFIED HEALTH 

STATUS   SNOMED Code(s): 752644792


   Comment: 


   


Status and Disposition: 





Condition: Guarded


Disposition: Admit inpatient to 


Attending: Anita Bassett

## 2019-12-31 NOTE — PN
Hospitalist Progress Note


Date of Service: 19





CTSP for elevated trop





88 y/o with history of triple vessel disease s/p CABG in 2017, sick sinus 

syndrome s/p dual chamber pacemaker placement, HTN, HLD.  He had chest 

heaviness as well as pain along his left arm starting 4 days ago, therefore he 

came to yesterday.





He was sleeping soundly when I assessed him. He stated he had chest heaviness 

in the past few days, but largely resolved right now. He recalled the chest 

heaviness was not related to exertion as he couldn't do much anyway since all 

the cardiac events. 








PE:


JVP hard to access


Heart: central scar, S1,S2, no murmur


Lung: clear


left side edema up to ankle





Labs:


trop uptrendin.42->0.53->0.61


EKG: Ventricular paced rhythm, ST elevation in anterior and inferior lead which 

was there since 2019








A+P


1. non ST elevation MI


- concerning for T1MI 


- aspirin loading dose given, will start brillinta loading dose 


- heparin drip per protocol


- not on high potency statin dose, not sure why, ?tolerance issue, will leave 

it till finding it out


- cardiology consult in the morning for cardiac cath


- continue to trend trop

## 2020-01-01 LAB
ANION GAP SERPL CALC-SCNC: 9 MMOL/L (ref 2–11)
BASOPHILS # BLD AUTO: 0 10^3/UL (ref 0–0.2)
BUN SERPL-MCNC: 49 MG/DL (ref 6–24)
BUN/CREAT SERPL: 15.9 (ref 8–20)
CALCIUM SERPL-MCNC: 8.3 MG/DL (ref 8.6–10.3)
CHLORIDE SERPL-SCNC: 103 MMOL/L (ref 101–111)
CHOLEST SERPL-MCNC: 102 MG/DL
EOSINOPHIL # BLD AUTO: 0.2 10^3/UL (ref 0–0.6)
GLUCOSE SERPL-MCNC: 108 MG/DL (ref 70–100)
HCO3 SERPL-SCNC: 20 MMOL/L (ref 22–32)
HCT VFR BLD AUTO: 28 % (ref 42–52)
HDLC SERPL-MCNC: 26 MG/DL
HGB BLD-MCNC: 9.6 G/DL (ref 14–18)
LYMPHOCYTES # BLD AUTO: 1 10^3/UL (ref 1–4.8)
MCH RBC QN AUTO: 33 PG (ref 27–31)
MCHC RBC AUTO-ENTMCNC: 34 G/DL (ref 31–36)
MCV RBC AUTO: 96 FL (ref 80–94)
MONOCYTES # BLD AUTO: 0.7 10^3/UL (ref 0–0.8)
NEUTROPHILS # BLD AUTO: 5.1 10^3/UL (ref 1.5–7.7)
NRBC # BLD AUTO: 0 10^3/UL
NRBC BLD QL AUTO: 0.2
PLATELET # BLD AUTO: 268 10^3/UL (ref 150–450)
POTASSIUM SERPL-SCNC: 4.1 MMOL/L (ref 3.5–5)
RBC # BLD AUTO: 2.91 10^6 /UL (ref 4.18–5.48)
SODIUM SERPL-SCNC: 132 MMOL/L (ref 135–145)
TRIGL SERPL-MCNC: 57 MG/DL
WBC # BLD AUTO: 7.2 10^3/UL (ref 3.5–10.8)

## 2020-01-01 RX ADMIN — HEPARIN SODIUM SCH UNITS: 5000 INJECTION INTRAVENOUS; SUBCUTANEOUS at 21:47

## 2020-01-01 RX ADMIN — ASPIRIN SCH MG: 81 TABLET, COATED ORAL at 08:48

## 2020-01-01 RX ADMIN — PANTOPRAZOLE SODIUM SCH MG: 40 TABLET, DELAYED RELEASE ORAL at 08:49

## 2020-01-01 RX ADMIN — Medication SCH UNITS: at 08:49

## 2020-01-01 RX ADMIN — METOPROLOL SUCCINATE SCH MG: 25 TABLET, EXTENDED RELEASE ORAL at 21:38

## 2020-01-01 RX ADMIN — CYANOCOBALAMIN TAB 500 MCG SCH MCG: 500 TAB at 08:49

## 2020-01-01 RX ADMIN — ATORVASTATIN CALCIUM SCH MG: 40 TABLET, FILM COATED ORAL at 08:49

## 2020-01-01 RX ADMIN — HYDROXYCHLOROQUINE SULFATE SCH MG: 200 TABLET, FILM COATED ORAL at 08:48

## 2020-01-01 RX ADMIN — METOPROLOL SUCCINATE SCH MG: 25 TABLET, EXTENDED RELEASE ORAL at 08:49

## 2020-01-01 RX ADMIN — IPRATROPIUM BROMIDE AND ALBUTEROL SULFATE PRN NEB: .5; 3 SOLUTION RESPIRATORY (INHALATION) at 09:06

## 2020-01-01 RX ADMIN — THERA TABS SCH TAB: TAB at 08:49

## 2020-01-01 RX ADMIN — FOLIC ACID SCH MG: 1 TABLET ORAL at 08:49

## 2020-01-01 NOTE — PN
Subjective


Date of Service: 01/01/20


Interval History: 





Seen with wife at bedside


Feels "stronger" today after transfusion of blood


SOB improving but does not walk much 


no chest pain 


Family History: Unchanged from Admission


Social History: Unchanged from Admission


Past Medical History: Unchanged from Admission





Objective


Active Medications: 








Acetaminophen (Tylenol Tab*)  650 mg PO Q4H PRN


   PRN Reason: MILD PAIN or TEMP > 100.4


Albuterol/Ipratropium (Duoneb (Albuterol 2.5 Mg/Ipratropium 0.5 Mg))  1 neb INH 

Q4H PRN


   PRN Reason: SOB/WHEEZING


   Last Admin: 01/01/20 09:06 Dose:  1 neb


Aspirin (Aspirin Ec Tab*)  81 mg PO DAILY [facility]


   Last Admin: 01/01/20 08:48 Dose:  81 mg


Atorvastatin Calcium (Lipitor*)  40 mg PO DAILY [facility]


   Last Admin: 01/01/20 08:49 Dose:  40 mg


Calcium Carbonate (Tums*)  500 mg PO Q4H PRN


   PRN Reason: INDIGESTION


   Last Admin: 12/31/19 14:13 Dose:  500 mg


Cholecalciferol (Vitamin D Tab*)  1,000 units PO DAILY [facility]


   Last Admin: 01/01/20 08:49 Dose:  1,000 units


Cyanocobalamin (Vitamin B12 Tab*)  500 mcg PO DAILY [facility]


   Last Admin: 01/01/20 08:49 Dose:  500 mcg


Folic Acid (Folvite Tab*)  1 mg PO DAILY [facility]


   Last Admin: 01/01/20 08:49 Dose:  1 mg


Furosemide (Lasix Tab*)  60 mg PO DAILY [facility]


Heparin Sodium (Porcine) (Heparin Vial(*))  0 units IV .PER PROTOCOL [facility]


   Last Admin: 12/31/19 03:53 Dose:  4,000 units


Hydroxychloroquine Sulfate (Plaquenil Tab*)  200 mg PO DAILY [facility]


   Last Admin: 01/01/20 08:48 Dose:  200 mg


Metoprolol Succinate (Toprol Xl Tab*)  25 mg PO BID [facility]


   Last Admin: 01/01/20 08:49 Dose:  25 mg


Multivitamins/Minerals (Theragran/Minerals Tab*)  1 tab PO DAILY [facility]


   Last Admin: 01/01/20 08:49 Dose:  1 tab


Oxycodone/Acetaminophen (Percocet 5/325 Tab*)  1 tab PO Q6H PRN


   PRN Reason: PAIN - SEVERE


Pantoprazole Sodium (Protonix Tab*)  40 mg PO DAILY [facility]


   Last Admin: 01/01/20 08:49 Dose:  40 mg








 Vital Signs - 8 hr











  01/01/20 01/01/20 01/01/20





  07:51 09:09 11:15


 


Temperature   98.7 F


 


Pulse Rate  82 71


 


Respiratory 20 16 20





Rate   


 


Blood Pressure   111/52





(mmHg)   


 


O2 Sat by Pulse  94 97





Oximetry   














  01/01/20





  15:15


 


Temperature 97.7 F


 


Pulse Rate 73


 


Respiratory 22





Rate 


 


Blood Pressure 108/55





(mmHg) 


 


O2 Sat by Pulse 98





Oximetry 











Oxygen Devices in Use Now: None


Appearance: NAD


Eyes: No Scleral Icterus, PERRLA


Ears/Nose/Mouth/Throat: Mucous Membranes Moist


Neck: NL Appearance and Movements; NL JVP, Trachea Midline


Respiratory: Symmetrical Chest Expansion and Respiratory Effort, - - rales in 

right base


Cardiovascular: - - early 2/6 ADRIENNE 


Abdominal: NL Sounds; No Tenderness; No Distention, No Hepatosplenomegaly


Lymphatic: No Cervical Adenopathy


Extremities: - - 1+ le edema


Neurological: Alert and Oriented x 3


Result Diagrams: 


 01/01/20 08:51





 01/01/20 08:51


Microbiology and Other Data: 


 Microbiology











 12/30/19 17:45 Urine Culture - Final





 Urine    No Growth (<1,000 CFU/mL)














Assess/Plan/Problems-Billing


Assessment: 





This is an 87 year old male with a past medical history of NSTEMI, systolic CHF 

and afib a/w chest pain  and shortness of breath.





- Patient Problems


(1) Acute on chronic systolic (congestive) heart failure


Current Visit: No   Status: Acute   Code(s): I50.23 - ACUTE ON CHRONIC SYSTOLIC 

(CONGESTIVE) HEART FAILURE   SNOMED Code(s): 712469160


   Comment: - improving


- transition lasix from IV to PO tomorrow


-I/O, daily weights


   





(2) Acute renal failure superimposed on stage 3 chronic kidney disease


Comment: - improving 


-continue suprapubic catheter for obstructive uropathy


   





(3) Afib


Comment: -Heparin drip and brillinta stopped per Cardiology. 


-Continue metoprolol.


- not on AC


   





(4) Anemia


Comment: - sp 2 U PRBC 12/31


-Previous folate and B12 studies on last admission were normal. Has iron 

deficiency anemia which is likely anemia of chronic cardiorenal disease. 

Recommend follow up with nephrology as he may require epogen. 


   





(5) CAD (coronary artery disease)


Comment: - pt reiterated his wishes with this author - does not want aggressive 

interventions which include no additional caths. He is DNR. He has 2 MOLSTs at 

home and we will not fill out a 3rd now. I encouraged him to review his MOLST 

and discard 1 to maintain 1 active record


-Continue ASA, lipitor, metoprolol


- heparin in brillenta stopped   





(6) Elevated troponin


Comment: - declines any intervention if offered


- will not pursue stress or TTE for further evaluation    





(7) DVT prophylaxis


Comment: HSQ   


Status and Disposition: 





inpatient

## 2020-01-02 VITALS — DIASTOLIC BLOOD PRESSURE: 48 MMHG | SYSTOLIC BLOOD PRESSURE: 114 MMHG

## 2020-01-02 RX ADMIN — ATORVASTATIN CALCIUM SCH MG: 40 TABLET, FILM COATED ORAL at 09:23

## 2020-01-02 RX ADMIN — Medication SCH UNITS: at 09:23

## 2020-01-02 RX ADMIN — METOPROLOL SUCCINATE SCH MG: 25 TABLET, EXTENDED RELEASE ORAL at 09:23

## 2020-01-02 RX ADMIN — HEPARIN SODIUM SCH UNITS: 5000 INJECTION INTRAVENOUS; SUBCUTANEOUS at 05:30

## 2020-01-02 RX ADMIN — THERA TABS SCH TAB: TAB at 09:23

## 2020-01-02 RX ADMIN — ASPIRIN SCH MG: 81 TABLET, COATED ORAL at 09:23

## 2020-01-02 RX ADMIN — CYANOCOBALAMIN TAB 500 MCG SCH MCG: 500 TAB at 09:23

## 2020-01-02 RX ADMIN — FOLIC ACID SCH MG: 1 TABLET ORAL at 09:23

## 2020-01-02 RX ADMIN — PANTOPRAZOLE SODIUM SCH MG: 40 TABLET, DELAYED RELEASE ORAL at 09:23

## 2020-01-02 RX ADMIN — HYDROXYCHLOROQUINE SULFATE SCH MG: 200 TABLET, FILM COATED ORAL at 09:23

## 2020-01-02 NOTE — DS
CC:  Dr. Boo *

 

DISCHARGE SUMMARY:

 

DATE OF ADMISSION:  12/30/19

 

DATE OF DISCHARGE:  01/02/20

 

PRIMARY CARE PHYSICIAN:  Dr. Boo.

 

DISPOSITION ON DISCHARGE:  Home.

 

CONDITION ON DISCHARGE:  Stable.

 

PRIMARY DIAGNOSIS:  Non-ST elevation myocardial infarction.

 

SECONDARY DIAGNOSES:  Include:

1.  Anemia.

2.  Atrial fibrillation.

3.  Elevated troponin.

4.  Coronary artery disease.

5.  Hypertension.

6.  Hyperlipidemia.

7.  Chronic kidney disease.

 

MEDICATIONS AT DISCHARGE:  Include:

1.  Oxycodone/acetaminophen 5/325 one tab every 6 hours as needed for pain.

2.  Metamucil 1 pack daily.

3.  Omeprazole 20 mg daily.

4.  Multivitamin 1 cap daily.

5.  Toprol-XL 25 mg twice daily.

6.  Plaquenil 200 mg daily.

7.  Furosemide 60 mg in the morning.

8.  Folic acid 1 mg daily.

9.  Vitamin B12 500 mcg daily.

10.  Cholecalciferol 1000 units daily.

11.  Calcium carbonate 500 mg twice daily.

12.  Atorvastatin 10 mg daily.

13.  Aspirin 81 mg daily.

14.  Acetaminophen 500 mg twice daily as needed for pain or fever.

 

PERTINENT LABS DURING THE COURSE OF HOSPITAL STAY:  Hemoglobin 7.9 on 
presentation, 9.6 on discharge.  Troponin-I peaked at 0.61. Triglycerides 57, 
cholesterol 102, LDL 65, HDL 26, BUN 49, creatinine 3 on day of discharge.  
Urine culture is negative.

 

PERTINENT IMAGING PERFORMED DURING HOSPITAL STAY:  CT abdomen and pelvis, 
impression:  No acute findings.  No hydronephrosis or radiopaque renal calculi. 
Suprapubic catheter is now present.  New bilateral pleural effusions, right 
larger than left.  Layering sludge or tiny stones in the gallbladder.  No 
pericholecystic strandings.

 

HISTORY OF PRESENT ILLNESS AND HOSPITAL COURSE:  This is an 87-year-old man 
with past medical history as outlined in history of present illness, on the day 
of admission presented to the hospital with shortness of breath and bilateral 
arm pain, found with elevated troponins at 0.38 that peaked at 0.61.  The 
patient was recently hospitalized from 12/13/19 to 12/21/19 with hypoglycemia, 
acute on chronic kidney disease in the setting of acute urinary retention 
secondary to an obstructive uropathy where he had a suprapubic catheter placed.
  The patient was placed on heparin and loaded Brilinta.  However, 
conversations with the patient including his wife concluded that he would not 
want additional therapies including left heart cath.  Heparin was discontinued 
as well as Brilinta.  He was seen in conjunction with Cardiology and had 
similar conversation with patient indicating that he would not want invasive 
procedures including left heart cath.  He additionally indicated he wanted to 
be DNR/DNI.  He discussed interest in PATH program.  He indicated that he had 
been on hospice previously, was discharged, but would be interested in the PATH 
program as long as he did not have to go somewhere in order to be evaluated.  
At the time of discharge, he had no chest pain.  There was some concern by a 
provider prior to this one that his chest discomfort and/or troponin may have 
been secondary to anemia and he did receive 2 units of packed red blood cells.  
At the time of discharge, he reported that he felt great and much improved 
prior to presentation.  There were no complications during the course of this 
hospital stay.

 

At followup, please:

1.  Ensure patient does follow with Nephrology for suprapubic catheter care 
that was placed prior to this hospital stay.

2.  I would make referral to PATH program, as patient and wife are interested 
in palliative care going forward.

3.  No other specific labs or vitals that need followup.

 

Reasons to return to the hospital including, but not limited to recurrent or 
worsening symptoms including chest pain, shortness of breath, nausea, vomiting, 
lightheadedness, loss of consciousness, near loss of consciousness, bleeding 
from any source, inability to obtain or tolerate medications were discussed 
with the patient and his wife.  They acknowledged understanding.  Also 
discussed not returning to the hospital if their only interest was in comfort, 
which the patient and his wife will consider and discuss with primary care 
provider in PATH program if appropriate.

 

TIME SPENT:  Greater than 60 minutes was spent on discharge of this patient; 
greater than half was face-to-face with patient.

 

 [id number]/CPS #: [id number]

MARIO

## 2020-01-02 NOTE — PN
<Fay Lawrence - Last Filed: 01/02/20 09:06>





Subjective


Date of Service: 01/02/20 - troponin elevation, CHF, Renal failure


Interval History: 





No events last night, patient states since PRBC infusion he is feeling " great"

. no recurrent c/o chest pain since prior to admission. Breathing has improved. 

He offers no other complaints





Medications


Active Medications: 








Acetaminophen (Tylenol Tab*)  650 mg PO Q4H PRN


   PRN Reason: MILD PAIN or TEMP > 100.4


Albuterol/Ipratropium (Duoneb (Albuterol 2.5 Mg/Ipratropium 0.5 Mg))  1 neb INH 

Q4H PRN


   PRN Reason: SOB/WHEEZING


   Last Admin: 01/01/20 09:06 Dose:  1 neb


Aspirin (Aspirin Ec Tab*)  81 mg PO DAILY [facility]


   Last Admin: 01/01/20 08:48 Dose:  81 mg


Atorvastatin Calcium (Lipitor*)  40 mg PO DAILY [facility]


   Last Admin: 01/01/20 08:49 Dose:  40 mg


Calcium Carbonate (Tums*)  500 mg PO Q4H PRN


   PRN Reason: INDIGESTION


   Last Admin: 12/31/19 14:13 Dose:  500 mg


Cholecalciferol (Vitamin D Tab*)  1,000 units PO DAILY [facility]


   Last Admin: 01/01/20 08:49 Dose:  1,000 units


Cyanocobalamin (Vitamin B12 Tab*)  500 mcg PO DAILY [facility]


   Last Admin: 01/01/20 08:49 Dose:  500 mcg


Folic Acid (Folvite Tab*)  1 mg PO DAILY [facility]


   Last Admin: 01/01/20 08:49 Dose:  1 mg


Furosemide (Lasix Tab*)  60 mg PO DAILY [facility]


Heparin Sodium (Porcine) (Heparin Vial(*))  5,000 units SUBCUT Q8HR [facility]


   Last Admin: 01/02/20 05:30 Dose:  5,000 units


Hydroxychloroquine Sulfate (Plaquenil Tab*)  200 mg PO DAILY [facility]


   Last Admin: 01/01/20 08:48 Dose:  200 mg


Metoprolol Succinate (Toprol Xl Tab*)  25 mg PO BID [facility]


   Last Admin: 01/01/20 21:38 Dose:  25 mg


Multivitamins/Minerals (Theragran/Minerals Tab*)  1 tab PO DAILY [facility]


   Last Admin: 01/01/20 08:49 Dose:  1 tab


Oxycodone/Acetaminophen (Percocet 5/325 Tab*)  1 tab PO Q6H PRN


   PRN Reason: PAIN - SEVERE


Pantoprazole Sodium (Protonix Tab*)  40 mg PO DAILY [facility]


   Last Admin: 01/01/20 08:49 Dose:  40 mg








Objective


Vital Signs:











Temp Pulse Resp BP Pulse Ox


 


 97.9 F   68   16   112/48   100 


 


 01/02/20 02:42  01/02/20 02:42  01/02/20 02:42  01/02/20 02:53  01/02/20 02:42











Oxygen Devices in Use Now: None


Appearance: sitting upright in bed, NAD. Cooperative and pleasant


Ears/Nose/Mouth/Throat: NL Teeth, Lips, Gums, Clear Oropharnyx


Neck: NL Appearance and Movements; NL JVP, Trachea Midline


Respiratory: - - inspiratory rales noted in bilateral bases. respirations are 

non labored.


Cardiovascular: NL Sounds; No Murmurs; No JVD


Extremities: - - trace-1+ bilateral pitting pretibial.


Neurological: Alert and Oriented x 3


Lines/Tubes/Other Access: Clean, Dry and Intact Peripheral IV


Laboratory Results: 


 





 01/01/20 08:51 





 01/01/20 08:51 





 











INR (Anticoag Therapy)  1.22  (0.82-1.09)  H  12/30/19  17:09    


 


APTT  149.9 seconds (26.0-38.0)  H*  12/31/19  09:38    


 


Total Bilirubin  0.30 mg/dL (0.2-1.0)   12/30/19  17:09    


 


AST  44 U/L (13-39)  H  12/30/19  17:09    


 


ALT  51 U/L (7-52)   12/30/19  17:09    


 


Alkaline Phosphatase  336 U/L ()  H  12/30/19  17:09    


 


B-Natriuretic Peptide  > 1300 pg/mL (<=100)  H  12/30/19  17:10    


 


Total Protein  7.2 g/dL (6.4-8.9)   12/30/19  17:09    


 


Albumin  3.5 g/dL (3.2-5.2)   12/30/19  17:09    


 


Globulin  3.7 g/dL (2-4)   12/30/19  17:09    


 


Albumin/Globulin Ratio  0.9  (1-3)  L  12/30/19  17:09    


 


Triglycerides  57 mg/dL  01/01/20  08:51    


 


Cholesterol  102 mg/dL  01/01/20  08:51    


 


LDL Cholesterol  65 mg/dL  01/01/20  08:51    


 


HDL Cholesterol  26.0 mg/dL  01/01/20  08:51    








 











  12/30/19 12/30/19 12/30/19





  17:09 19:42 22:26


 


Troponin I  0.38 H*  0.42 H*  0.53 H*














  12/31/19 12/31/19 12/31/19





  01:59 03:54 09:38


 


Troponin I  0.61 H*  0.61 H*  0.43 H*








 Laboratory Results - last 24 hr











  01/01/20 01/01/20





  08:51 08:51


 


WBC  7.2 


 


RBC  2.91 L 


 


Hgb  9.6 L 


 


Hct  28 L 


 


MCV  96 H 


 


MCH  33 H 


 


MCHC  34 


 


RDW  17 H 


 


Plt Count  268 


 


MPV  9.8 


 


Neut % (Auto)  71.4 


 


Lymph % (Auto)  14.4 


 


Mono % (Auto)  10.3 


 


Eos % (Auto)  3.3 


 


Baso % (Auto)  0.6 


 


Absolute Neuts (auto)  5.1 


 


Absolute Lymphs (auto)  1.0 


 


Absolute Monos (auto)  0.7 


 


Absolute Eos (auto)  0.2 


 


Absolute Basos (auto)  0.0 


 


Absolute Nucleated RBC  0.0 


 


Nucleated RBC %  0.2 


 


Sodium   132 L


 


Potassium   4.1


 


Chloride   103


 


Carbon Dioxide   20 L


 


Anion Gap   9


 


BUN   49 H


 


Creatinine   3.08 H


 


Est GFR ( Amer)   23.4


 


Est GFR (Non-Af Amer)   19.3


 


BUN/Creatinine Ratio   15.9


 


Glucose   108 H


 


Calcium   8.3 L


 


Triglycerides   57


 


Cholesterol   102


 


LDL Cholesterol   65


 


HDL Cholesterol   26.0














Assessment/Plan





#1 Troponin elevation; Patient presented with 3-4 day h/o intermittent chest 

pain, sob and weakness. Hgb 7.9. He was placed on IV heparin and loaded with 

Brilinta. Hgb dropped to 7.1. Required PRBCs. Patient opted for conservative 

medical management and expressed to not have any invasive procedures. Molst 

form d/w patient by primary team. His is a DNR but in EHR is listed as a Full 

code? will differ to primary team in regards to updating code status. Continue 

ASA 81/day, bblocker and statin therapy. He can f/u with Dr. Faria in 4-6 

weeks. Consider PATH referral. Will sign off. 





#2 CKD; creat improved. Primary team managing. 





#3 Long standing history of anemia; s/p PRBC transfusion with clinical 

improvement. Primary team following.





#4 h/o SHF with low flow low gradient AS; appears compensated. Responded well 

to IV PRBCs. avoid volume contraction, after load reduction. On topril therapy. 

No ACEI/ARB/ALDACTONE/Entresto due to CKD


Attending: Qutaybeh Maghaydah





<Maghaydah,Qutaybeh - Last Filed: 01/02/20 10:02>





Medications


Active Medications: 








Acetaminophen (Tylenol Tab*)  650 mg PO Q4H PRN


   PRN Reason: MILD PAIN or TEMP > 100.4


Albuterol/Ipratropium (Duoneb (Albuterol 2.5 Mg/Ipratropium 0.5 Mg))  1 neb INH 

Q4H PRN


   PRN Reason: SOB/WHEEZING


   Last Admin: 01/01/20 09:06 Dose:  1 neb


Aspirin (Aspirin Ec Tab*)  81 mg PO DAILY [facility]


   Last Admin: 01/02/20 09:23 Dose:  81 mg


Atorvastatin Calcium (Lipitor*)  40 mg PO DAILY [facility]


   Last Admin: 01/02/20 09:23 Dose:  40 mg


Calcium Carbonate (Tums*)  500 mg PO Q4H PRN


   PRN Reason: INDIGESTION


   Last Admin: 12/31/19 14:13 Dose:  500 mg


Cholecalciferol (Vitamin D Tab*)  1,000 units PO DAILY [facility]


   Last Admin: 01/02/20 09:23 Dose:  1,000 units


Cyanocobalamin (Vitamin B12 Tab*)  500 mcg PO DAILY [facility]


   Last Admin: 01/02/20 09:23 Dose:  500 mcg


Folic Acid (Folvite Tab*)  1 mg PO DAILY [facility]


   Last Admin: 01/02/20 09:23 Dose:  1 mg


Furosemide (Lasix Tab*)  60 mg PO DAILY [facility]


   Last Admin: 01/02/20 09:22 Dose:  60 mg


Heparin Sodium (Porcine) (Heparin Vial(*))  5,000 units SUBCUT Q8HR [facility]


   Last Admin: 01/02/20 05:30 Dose:  5,000 units


Hydroxychloroquine Sulfate (Plaquenil Tab*)  200 mg PO DAILY [facility]


   Last Admin: 01/02/20 09:23 Dose:  200 mg


Metoprolol Succinate (Toprol Xl Tab*)  25 mg PO BID [facility]


   Last Admin: 01/02/20 09:23 Dose:  25 mg


Multivitamins/Minerals (Theragran/Minerals Tab*)  1 tab PO DAILY [facility]


   Last Admin: 01/02/20 09:23 Dose:  1 tab


Oxycodone/Acetaminophen (Percocet 5/325 Tab*)  1 tab PO Q6H PRN


   PRN Reason: PAIN - SEVERE


Pantoprazole Sodium (Protonix Tab*)  40 mg PO DAILY [facility]


   Last Admin: 01/02/20 09:23 Dose:  40 mg








Objective


Vital Signs:











Temp Pulse Resp BP Pulse Ox


 


 99.3 F   67   20   131/61   97 


 


 01/02/20 07:43  01/02/20 07:43  01/02/20 08:00  01/02/20 07:43  01/02/20 07:43











Laboratory Results: 


 





 01/01/20 08:51 





 01/01/20 08:51 





 











INR (Anticoag Therapy)  1.22  (0.82-1.09)  H  12/30/19  17:09    


 


APTT  149.9 seconds (26.0-38.0)  H*  12/31/19  09:38    


 


Total Bilirubin  0.30 mg/dL (0.2-1.0)   12/30/19  17:09    


 


AST  44 U/L (13-39)  H  12/30/19  17:09    


 


ALT  51 U/L (7-52)   12/30/19  17:09    


 


Alkaline Phosphatase  336 U/L ()  H  12/30/19  17:09    


 


B-Natriuretic Peptide  > 1300 pg/mL (<=100)  H  12/30/19  17:10    


 


Total Protein  7.2 g/dL (6.4-8.9)   12/30/19  17:09    


 


Albumin  3.5 g/dL (3.2-5.2)   12/30/19  17:09    


 


Globulin  3.7 g/dL (2-4)   12/30/19  17:09    


 


Albumin/Globulin Ratio  0.9  (1-3)  L  12/30/19  17:09    


 


Triglycerides  57 mg/dL  01/01/20  08:51    


 


Cholesterol  102 mg/dL  01/01/20  08:51    


 


LDL Cholesterol  65 mg/dL  01/01/20  08:51    


 


HDL Cholesterol  26.0 mg/dL  01/01/20  08:51    








 











  12/30/19 12/30/19 12/30/19





  17:09 19:42 22:26


 


Troponin I  0.38 H*  0.42 H*  0.53 H*














  12/31/19 12/31/19 12/31/19





  01:59 03:54 09:38


 


Troponin I  0.61 H*  0.61 H*  0.43 H*














Assessment/Plan





1.2.2020 10:00 am. Pt seen and examined. above plan of medical care reviewed 

and discussed. Pt feels better without cp . F/u as out patient with cardiology.